# Patient Record
Sex: MALE | Race: OTHER | Employment: OTHER | URBAN - METROPOLITAN AREA
[De-identification: names, ages, dates, MRNs, and addresses within clinical notes are randomized per-mention and may not be internally consistent; named-entity substitution may affect disease eponyms.]

---

## 2017-03-20 ENCOUNTER — HOSPITAL ENCOUNTER (OUTPATIENT)
Dept: CT IMAGING | Facility: HOSPITAL | Age: 82
Discharge: HOME | End: 2017-03-20
Attending: INTERNAL MEDICINE
Payer: COMMERCIAL

## 2017-03-20 ENCOUNTER — TELEPHONE (OUTPATIENT)
Dept: INTERNAL MEDICINE CLINIC | Facility: CLINIC | Age: 82
End: 2017-03-20

## 2017-03-20 ENCOUNTER — OFFICE VISIT (OUTPATIENT)
Dept: INTERNAL MEDICINE CLINIC | Facility: CLINIC | Age: 82
End: 2017-03-20

## 2017-03-20 ENCOUNTER — HOSPITAL ENCOUNTER (OUTPATIENT)
Dept: CT IMAGING | Facility: HOSPITAL | Age: 82
Discharge: HOME | End: 2017-03-20
Attending: INTERNAL MEDICINE | Admitting: INTERNAL MEDICINE
Payer: COMMERCIAL

## 2017-03-20 ENCOUNTER — HOSPITAL ENCOUNTER (OUTPATIENT)
Dept: GENERAL RADIOLOGY | Facility: HOSPITAL | Age: 82
Discharge: HOME | End: 2017-03-20
Attending: INTERNAL MEDICINE
Payer: COMMERCIAL

## 2017-03-20 VITALS
RESPIRATION RATE: 18 BRPM | SYSTOLIC BLOOD PRESSURE: 160 MMHG | HEIGHT: 67 IN | HEART RATE: 66 BPM | BODY MASS INDEX: 19.17 KG/M2 | DIASTOLIC BLOOD PRESSURE: 86 MMHG | TEMPERATURE: 98 F | WEIGHT: 122.13 LBS

## 2017-03-20 DIAGNOSIS — S13.101A TRAUMATIC DISC HERNIATION OF CERVICAL SPINE: ICD-10-CM

## 2017-03-20 DIAGNOSIS — S09.90XA HEAD TRAUMA, INITIAL ENCOUNTER: Primary | ICD-10-CM

## 2017-03-20 DIAGNOSIS — S09.90XA HEAD TRAUMA, INITIAL ENCOUNTER: ICD-10-CM

## 2017-03-20 DIAGNOSIS — R05.9 COUGH: ICD-10-CM

## 2017-03-20 PROCEDURE — 71020 XR CHEST PA + LAT CHEST (CPT=71020): CPT

## 2017-03-20 PROCEDURE — G0463 HOSPITAL OUTPT CLINIC VISIT: HCPCS | Performed by: INTERNAL MEDICINE

## 2017-03-20 PROCEDURE — 72125 CT NECK SPINE W/O DYE: CPT

## 2017-03-20 PROCEDURE — 99214 OFFICE O/P EST MOD 30 MIN: CPT | Performed by: INTERNAL MEDICINE

## 2017-03-20 PROCEDURE — 70450 CT HEAD/BRAIN W/O DYE: CPT

## 2017-03-20 RX ORDER — ENALAPRIL MALEATE 20 MG/1
20 TABLET ORAL DAILY
Status: ON HOLD | COMMUNITY
End: 2017-04-29

## 2017-03-20 RX ORDER — NABUMETONE 750 MG/1
750 TABLET, FILM COATED ORAL 2 TIMES DAILY
COMMUNITY
End: 2017-03-23

## 2017-03-20 NOTE — PROGRESS NOTES
HPI:    Patient ID: Martín Shah is a 80year old male. As per patient's son, the patient has been falling several times at home in [facility]. In one occurrence, the neighbors found the patient on the floor due to fall.  The neighbors checked his blood OTHER SURGICAL HISTORY  2000    Comment blood clot removal from brain    HERNIA SURGERY        Family History   Problem Relation Age of Onset   • Heart Disorder Mother    • Hypertension Mother         Smoking Status: Former Smoker                   Packs/D Resp: 18   Height: 5' 7\" (1.702 m)   Weight: 122 lb 1.6 oz (55.384 kg)         Body mass index is 19.12 kg/(m^2). ASSESSMENT/PLAN:   (S09.90XA) Head trauma, initial encounter  (primary encounter diagnosis)  Patient c/o severe headaches.  HA sta

## 2017-03-20 NOTE — TELEPHONE ENCOUNTER
Pt was wheeled back by his son stating that the CT showed something and he wanted to know what it was. Pt son stated that they tried to reach Dr Smita Carnes twice but got no call back. Pt son would like to know what is wrong with his father.  Please advise

## 2017-03-20 NOTE — TELEPHONE ENCOUNTER
[place]año from [address] [place] [address] asking to speak with Dr. Jama Primrose to report STAT/ Hold CT results.

## 2017-03-21 NOTE — TELEPHONE ENCOUNTER
I was able to contact patient's son. I discussed the results of the CAT scans done and instructed him to make a follow-up appointment with Mr. Corey Simms in the next 2 days. The risks, benefits and side effects of treatment  plan  were dicussed with the patien

## 2017-03-21 NOTE — TELEPHONE ENCOUNTER
Please see other communication for this patient. I did talk to the patient's son and discussed the CAT scan results with him. He was instructed to make a follow-up appointment the next 2 days.

## 2017-03-21 NOTE — TELEPHONE ENCOUNTER
CONCLUSION:   1. There are small isodense extra-axial fluid collections at the left frontal subdural space, and at the posterior left parietal subdural space suggesting subacute subdural hematomas with minimal mass effect upon both sites.  No shift of midli

## 2017-03-21 NOTE — TELEPHONE ENCOUNTER
Elise Sushma at 3/20/2017  6:58 PM      Status: Signed : Elise Sushma     Expand All Collapse All    Pt was wheeled back by his son stating that the CT showed something and he wanted to know what it was.  Pt son stated that they trie

## 2017-03-21 NOTE — TELEPHONE ENCOUNTER
Please see communication from me I communicated with the patient's son but there is no answer and I left a message on his voicemail.

## 2017-03-23 ENCOUNTER — TELEPHONE (OUTPATIENT)
Dept: INTERNAL MEDICINE CLINIC | Facility: CLINIC | Age: 82
End: 2017-03-23

## 2017-03-23 RX ORDER — MELOXICAM 15 MG/1
15 TABLET ORAL DAILY
Qty: 30 TABLET | Refills: 0 | Status: SHIPPED | OUTPATIENT
Start: 2017-03-23 | End: 2017-04-18

## 2017-03-23 NOTE — TELEPHONE ENCOUNTER
Son calling states Dr asked him to call with pt update, pt still c/o HA.  Son requesting to speak to

## 2017-03-23 NOTE — TELEPHONE ENCOUNTER
See a medication for the patient to take once a day with breakfast.  He has to take it with food. Call the patient and relay the information.  Thanks

## 2017-03-23 NOTE — TELEPHONE ENCOUNTER
Son stts pt still has headaches and neck pain with little relief from Tylenol. Would like to know next step please.

## 2017-03-28 ENCOUNTER — TELEPHONE (OUTPATIENT)
Dept: INTERNAL MEDICINE CLINIC | Facility: CLINIC | Age: 82
End: 2017-03-28

## 2017-03-28 DIAGNOSIS — R26.81 UNSTABLE GAIT: Primary | ICD-10-CM

## 2017-03-28 DIAGNOSIS — M15.9 GENERALIZED OA: ICD-10-CM

## 2017-03-29 NOTE — TELEPHONE ENCOUNTER
Ordered was entered and signed for a light wheelchair. Agapito Jha Please let the patient know and send a copy to the patient. Thanks.

## 2017-04-18 RX ORDER — MELOXICAM 15 MG/1
TABLET ORAL
Qty: 30 TABLET | Refills: 0 | Status: ON HOLD | OUTPATIENT
Start: 2017-04-18 | End: 2017-05-05

## 2017-04-29 ENCOUNTER — HOSPITAL ENCOUNTER (INPATIENT)
Facility: HOSPITAL | Age: 82
LOS: 6 days | Discharge: NURSING, REHAB OR LONG-TERM CARE | DRG: 872 | End: 2017-05-05
Attending: EMERGENCY MEDICINE | Admitting: HOSPITALIST
Payer: COMMERCIAL

## 2017-04-29 ENCOUNTER — APPOINTMENT (OUTPATIENT)
Dept: CT IMAGING | Facility: HOSPITAL | Age: 82
DRG: 872 | End: 2017-04-29
Attending: EMERGENCY MEDICINE
Payer: COMMERCIAL

## 2017-04-29 ENCOUNTER — APPOINTMENT (OUTPATIENT)
Dept: GENERAL RADIOLOGY | Facility: HOSPITAL | Age: 82
DRG: 872 | End: 2017-04-29
Attending: EMERGENCY MEDICINE
Payer: COMMERCIAL

## 2017-04-29 DIAGNOSIS — K83.09 CHOLANGITIS: Primary | ICD-10-CM

## 2017-04-29 DIAGNOSIS — E11.65 DIABETES TYPE 2, UNCONTROLLED (HCC): ICD-10-CM

## 2017-04-29 PROBLEM — R73.9 HYPERGLYCEMIA: Status: ACTIVE | Noted: 2017-04-29

## 2017-04-29 PROBLEM — E87.20 METABOLIC ACIDOSIS: Status: ACTIVE | Noted: 2017-04-29

## 2017-04-29 PROBLEM — E87.2 METABOLIC ACIDOSIS: Status: ACTIVE | Noted: 2017-04-29

## 2017-04-29 PROCEDURE — 99223 1ST HOSP IP/OBS HIGH 75: CPT | Performed by: HOSPITALIST

## 2017-04-29 PROCEDURE — 70450 CT HEAD/BRAIN W/O DYE: CPT

## 2017-04-29 PROCEDURE — 71010 XR CHEST AP PORTABLE  (CPT=71010): CPT

## 2017-04-29 PROCEDURE — 74177 CT ABD & PELVIS W/CONTRAST: CPT

## 2017-04-29 PROCEDURE — 99223 1ST HOSP IP/OBS HIGH 75: CPT | Performed by: INTERNAL MEDICINE

## 2017-04-29 RX ORDER — SODIUM CHLORIDE 9 MG/ML
INJECTION, SOLUTION INTRAVENOUS
Status: DISPENSED
Start: 2017-04-29 | End: 2017-04-30

## 2017-04-29 RX ORDER — ONDANSETRON 2 MG/ML
4 INJECTION INTRAMUSCULAR; INTRAVENOUS EVERY 6 HOURS PRN
Status: DISCONTINUED | OUTPATIENT
Start: 2017-04-29 | End: 2017-05-05

## 2017-04-29 RX ORDER — ACETAMINOPHEN 325 MG/1
650 TABLET ORAL EVERY 6 HOURS PRN
Status: DISCONTINUED | OUTPATIENT
Start: 2017-04-29 | End: 2017-05-05

## 2017-04-29 RX ORDER — DEXTROSE MONOHYDRATE 25 G/50ML
50 INJECTION, SOLUTION INTRAVENOUS AS NEEDED
Status: DISCONTINUED | OUTPATIENT
Start: 2017-04-29 | End: 2017-05-05

## 2017-04-29 RX ORDER — SODIUM CHLORIDE 9 MG/ML
INJECTION, SOLUTION INTRAVENOUS
Status: COMPLETED
Start: 2017-04-29 | End: 2017-04-29

## 2017-04-29 RX ORDER — NABUMETONE 750 MG/1
750 TABLET, FILM COATED ORAL 2 TIMES DAILY
Status: ON HOLD | COMMUNITY
End: 2017-05-05

## 2017-04-29 RX ORDER — SODIUM CHLORIDE 9 MG/ML
INJECTION, SOLUTION INTRAVENOUS CONTINUOUS
Status: DISCONTINUED | OUTPATIENT
Start: 2017-04-29 | End: 2017-05-01

## 2017-04-29 NOTE — H&P
69 Dayanna Arango Patient Status:  Emergency    1932 MRN [id number]   Location [address] Attending Aditya Santillan MD   Hosp Day # 0 PCP Elijah Rios MD     Date:   Status: Never Used                        Alcohol Use: No              Allergies/Medications: Allergies: No Known Allergies    (Not in a hospital admission)    Review of Systems:   A comprehensive 12 point review of systems was completed.   Pertinent posi but slightly lower density than previously seen suggesting more chronic subdural hematomas without significant mass effect. Correlate clinically. No new subdural hemorrhage or parenchymal hemorrhage is seen.  There are moderate chronic appearing deep white at a later time.           Rafal Thomas MD      **Certification        PHYSICIAN Certification of Need for Inpatient Hospitalization - Initial Certification      Patient will require inpatient services that will reasonably be expected to span two midni

## 2017-04-29 NOTE — SEPSIS REASSESSMENT
[facility]    Sepsis Reassessment Note    /55 mmHg  Pulse 92  Temp(Src) 97.7 °F (36.5 °C) (Oral)  Resp 20  Ht 5' 6\" (1.676 m)  Wt 122 lb 3.2 oz (55.43 kg)  BMI 19.73 kg/m2  SpO2 99%     6:30 PM    Cardiac:  Regularity: Regular  Rat

## 2017-04-29 NOTE — PROGRESS NOTES
[facility] Dosing Service  Antibiotic Dosing    Kody Marques is a 80year old male for whom pharmacy is dosing Zosyn for treatment of  intra-abdominal infection . Allergies: has No Known Allergies.     Vitals: BP 99/53 mmHg  Pulse 99  Temp(Src) 99 °F

## 2017-04-29 NOTE — ED NOTES
U/A collected via straight cath. 16 fr straight cath inserted without difficulty with immediate return of dark yellow urine - specimen obtained and sent. Patient tolerated well.

## 2017-04-29 NOTE — ED INITIAL ASSESSMENT (HPI)
Pt came in via EMS for fatigue and decreased appetite since yesterday. Lives at home with family. 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 Walks independently at home per family however weak today, one assist. RR even and nonlabored.  Family denies co

## 2017-04-29 NOTE — ED PROVIDER NOTES
Patient Seen in: [facility] 3w/sw    History   Patient presents with:  Fatigue (constitutional, neurologic)    Stated Complaint: Cholangitis    HPI    80year old male who recently arrived to the [facility] 5 weeks ago from [facility] who has (PRINIVIL,ZESTRIL) 20 MG Oral Tab,  Take 20 mg by mouth daily. Glucose Blood ([employer] BLOOD GLUCOSE TEST) In Vitro Strip,  Test twice daily   glipiZIDE (GLUCOTROL) 10 MG Oral Tab,  Take 2 tablets by mouth 2 (two) times daily before meals.    Insulin NPH, light.   Neck: Normal range of motion. Neck supple. Cardiovascular: Normal rate, regular rhythm, normal heart sounds and intact distal pulses. No murmur heard. Pulmonary/Chest: Effort normal and breath sounds normal. No respiratory distress.  He has n components within normal limits   POCT GLUCOSE - Abnormal; Notable for the following:     POC Glucose  176 (*)     All other components within normal limits   POCT GLUCOSE - Abnormal; Notable for the following:     POC Glucose  182 (*)     All other compon left parietal subdural space about the same in overall amount from previous recent study of 3/20/17, but slightly lower density   than previously seen suggesting more chronic subdural hematomas without significant mass effect. Correlate clinically.  No new pref for further imaging and advises CT abd/pel with oral and iv contrast. Ok for pt to start oral contrast from ER, go to room, then have CT Abd from floor    Disposition and Plan     Clinical Impression:  Cholangitis  (primary encounter diagnosis)    Dis

## 2017-04-29 NOTE — CONSULTS
[facility]   Gastroenterology Consultation Note    Shexi Milian  Patient Status:    Emergency  Date of Admission:         4/29/2017, Hospital day #0  Attending:   Veronica Ornelas MD  PCP:     Ez Viveros MD    Reason for Consultation: alcohol or use illicit drugs. Allergies:  No Known Allergies    Medications:    Current facility-administered medications:   •  Piperacillin Sod-Tazobactam So (ZOSYN) 3.375 g in dextrose 5 % 100 mL IVPB, 3.375 g, Intravenous, Once  •  sodium chloride 0. 04/29/2017   TP 7.8 04/29/2017    04/29/2017    04/29/2017   LIP 14 04/29/2017       Imaging:  Ct Brain Or Head ([id number])    4/29/2017  CONCLUSION: Redemonstration of small extra-axial fluid collections at the left frontal subdural space, and a contrast  -IV abx with zosyn  -Trend LFTs, checking INR  -Will check viral hep panel (acute)  -Clears diet  -DVT ppx  -Continue outpatient ursodiol 300mg/day  -Will follow       Son balta @ [phone]    MD LETICIA Thompson AND ASHOK SRINIVASAN [facility]

## 2017-04-30 ENCOUNTER — APPOINTMENT (OUTPATIENT)
Dept: MRI IMAGING | Facility: HOSPITAL | Age: 82
DRG: 872 | End: 2017-04-30
Attending: INTERNAL MEDICINE
Payer: COMMERCIAL

## 2017-04-30 PROCEDURE — 99232 SBSQ HOSP IP/OBS MODERATE 35: CPT | Performed by: INTERNAL MEDICINE

## 2017-04-30 PROCEDURE — 76376 3D RENDER W/INTRP POSTPROCES: CPT

## 2017-04-30 PROCEDURE — 74183 MRI ABD W/O CNTR FLWD CNTR: CPT

## 2017-04-30 PROCEDURE — 99233 SBSQ HOSP IP/OBS HIGH 50: CPT | Performed by: HOSPITALIST

## 2017-04-30 RX ORDER — MAGNESIUM OXIDE 400 MG (241.3 MG MAGNESIUM) TABLET
800 TABLET ONCE
Status: COMPLETED | OUTPATIENT
Start: 2017-04-30 | End: 2017-04-30

## 2017-04-30 RX ORDER — SODIUM CHLORIDE 9 MG/ML
INJECTION, SOLUTION INTRAVENOUS
Status: DISPENSED
Start: 2017-04-30 | End: 2017-04-30

## 2017-04-30 NOTE — PROGRESS NOTES
[facility]     Gastroenterology Progress Note    Chetna Ambrocio Patient Status:  Inpatient    1932 MRN [id number]   Location [facility] 3W/SW Attending Diane Mckee MD   Hosp Day # 1 PCP Benja Frances MD       Sub Confusion slightly improved.      Recommend:  -IV abx to continue   -MRCP pending  -Trend LFTs  -Clears diet, NPO after MN for likely ERCP  -DVT ppx  -Continue outpatient ursodiol 300mg/day        Results:     Lab Results  Component Value Date   WBC 20.5* 0 Correlate with ERCP findings. There is significant intra-and extra hepatic biliary dilatation, and cholangitis cannot be excluded.  Marked pancreatic atrophy with a small amount of pancreatic tissue at the pancreatic head with a small focus of calcification

## 2017-04-30 NOTE — PROGRESS NOTES
[facility] Dosing Service  Antibiotic Dosing    Seb Sawyer is a 80year old male for whom pharmacy is dosing Merrem for treatment of  intra-abdominal infection . Allergies: has No Known Allergies.     Vitals: /55 mmHg  Pulse 92  Temp(Src) 97.7

## 2017-04-30 NOTE — PHYSICAL THERAPY NOTE
PHYSICAL THERAPY HIP EVALUATION - INPATIENT     Room Number: 329/329-A  Evaluation Date: 4/30/2017  Type of Evaluation: initial   Physician Order: PT Eval and Treat    Presenting Problem: admitted w/ AMS and progressive generalized weakness.   Reason for Th RESTRICTION  Weight Bearing Restriction: None                PAIN ASSESSMENT  Ratin          COGNITION  Oriented to person, place only w/ inconsistent command following requiring incr cues and redirection; communicated via pone  in [facility] [place] concerns addressed; Alarm set    ASSESSMENT     Patient is a 80year old male admitted 4/29/2017 for AM, progressive generalized weakness and RUQ pain. SDH redemonstrated in CT wo new hemorrhage, possible cholangitis and UTI.  Pt Surinamese speaking; communicat 4/30/2017

## 2017-04-30 NOTE — PROGRESS NOTES
[facility]    Progress Note    Espinoza Dennis Patient Status:  Inpatient    1932 MRN [id number]   Location [facility] 3W/SW Attending Uriel Jacob MD   Hosp Day # 1 PCP Gabriella Murillo MD       Subjective:   Espinoza Dennis is HCl, Pneumococcal Vac Polyvalent    Results:     Lab Results  Component Value Date   WBC 20.5* 04/30/2017   HGB 10.0* 04/30/2017   HCT 30.3* 04/30/2017   * 04/30/2017   CREATSERUM 1.19 04/30/2017   BUN 28* 04/30/2017    04/30/2017   K 4.1 04/3 Ct Abdomen+pelvis(contrast Only)(cpt=74177)    4/29/2017  CONCLUSION:  There is marked intra-and extrahepatic biliary dilatation with the common bile duct measuring up to 2 cm.  There is a stent in the mid to distal common bile duct extending into the d possible  -CLD  -appreciate GI and ID eval  -ruling out other potential sources with CXR and U/A, C+S    Elevated LFTs  -trending down  -hep panel ordered    Dementia  -start work up, TSH ok  -B12 borderline low, check MMA    DM II  -hold oral meds  -boby

## 2017-04-30 NOTE — CONSULTS
[facility]    Report of Consultation    Jewel Flowers Patient Status:  Inpatient    1932 MRN [id number]   Location [facility] 3W/SW Attending Essie Garcia MD   Hosp Day # 1 PCP Bard Mariama MD     Date of Admission:   Intravenous PRN   Glucose-Vitamin C (DEX-4) 4-0.006 g chewable tab 4 tablet 4 tablet Oral Q15 Min PRN   acetaminophen (TYLENOL) tab 650 mg 650 mg Oral Q6H PRN   ondansetron HCl (ZOFRAN) injection 4 mg 4 mg Intravenous Q6H PRN   0.9%  NaCl infusion  Intrave 113* 04/30/2017   ALT 89* 04/30/2017   INR 1.2 04/30/2017   PTP 14.6* 04/30/2017   TSH 1.57 04/29/2017   LIP 14* 04/29/2017   MG 1.5* 04/30/2017   B12 434 04/29/2017         Imaging:  Ct Brain Or Head ([id number])    4/29/2017  CONCLUSION: Redemonstration of sm feces in the rectosigmoid. Large posterior left lateral bladder wall diverticulum. Enlarged prostate.         Xr Chest Ap Portable  (cpt=71010)    4/29/2017  CONCLUSION: No acute pulmonary pathology on an AP portable view of the chest.              Davion

## 2017-05-01 ENCOUNTER — SURGERY (OUTPATIENT)
Age: 82
End: 2017-05-01

## 2017-05-01 ENCOUNTER — APPOINTMENT (OUTPATIENT)
Dept: GENERAL RADIOLOGY | Facility: HOSPITAL | Age: 82
DRG: 872 | End: 2017-05-01
Attending: INTERNAL MEDICINE
Payer: COMMERCIAL

## 2017-05-01 ENCOUNTER — ANESTHESIA (OUTPATIENT)
Dept: ENDOSCOPY | Facility: HOSPITAL | Age: 82
DRG: 872 | End: 2017-05-01
Payer: COMMERCIAL

## 2017-05-01 ENCOUNTER — TELEPHONE (OUTPATIENT)
Dept: GASTROENTEROLOGY | Facility: CLINIC | Age: 82
End: 2017-05-01

## 2017-05-01 ENCOUNTER — ANESTHESIA EVENT (OUTPATIENT)
Dept: ENDOSCOPY | Facility: HOSPITAL | Age: 82
DRG: 872 | End: 2017-05-01
Payer: COMMERCIAL

## 2017-05-01 PROCEDURE — 43274 ERCP DUCT STENT PLACEMENT: CPT | Performed by: INTERNAL MEDICINE

## 2017-05-01 PROCEDURE — 74328 X-RAY BILE DUCT ENDOSCOPY: CPT

## 2017-05-01 PROCEDURE — 0FC98ZZ EXTIRPATION OF MATTER FROM COMMON BILE DUCT, VIA NATURAL OR ARTIFICIAL OPENING ENDOSCOPIC: ICD-10-PCS | Performed by: INTERNAL MEDICINE

## 2017-05-01 PROCEDURE — 99497 ADVNCD CARE PLAN 30 MIN: CPT | Performed by: HOSPITALIST

## 2017-05-01 PROCEDURE — 43265 ERCP LITHOTRIPSY CALCULI: CPT | Performed by: INTERNAL MEDICINE

## 2017-05-01 PROCEDURE — BF101ZZ FLUOROSCOPY OF BILE DUCTS USING LOW OSMOLAR CONTRAST: ICD-10-PCS | Performed by: INTERNAL MEDICINE

## 2017-05-01 PROCEDURE — 99233 SBSQ HOSP IP/OBS HIGH 50: CPT | Performed by: HOSPITALIST

## 2017-05-01 PROCEDURE — 0FPB8DZ REMOVAL OF INTRALUMINAL DEVICE FROM HEPATOBILIARY DUCT, VIA NATURAL OR ARTIFICIAL OPENING ENDOSCOPIC: ICD-10-PCS | Performed by: INTERNAL MEDICINE

## 2017-05-01 DEVICE — BILIARY STENT WITH NAVIFLEXTM RX DELIVERY SYSTEM
Type: IMPLANTABLE DEVICE | Status: FUNCTIONAL
Brand: ADVANIX™ BILIARY

## 2017-05-01 RX ORDER — MORPHINE SULFATE 10 MG/ML
6 INJECTION, SOLUTION INTRAMUSCULAR; INTRAVENOUS EVERY 10 MIN PRN
Status: DISCONTINUED | OUTPATIENT
Start: 2017-05-01 | End: 2017-05-01 | Stop reason: HOSPADM

## 2017-05-01 RX ORDER — NALOXONE HYDROCHLORIDE 0.4 MG/ML
80 INJECTION, SOLUTION INTRAMUSCULAR; INTRAVENOUS; SUBCUTANEOUS AS NEEDED
Status: DISCONTINUED | OUTPATIENT
Start: 2017-05-01 | End: 2017-05-01 | Stop reason: HOSPADM

## 2017-05-01 RX ORDER — HYDROMORPHONE HYDROCHLORIDE 1 MG/ML
0.6 INJECTION, SOLUTION INTRAMUSCULAR; INTRAVENOUS; SUBCUTANEOUS EVERY 5 MIN PRN
Status: DISCONTINUED | OUTPATIENT
Start: 2017-05-01 | End: 2017-05-01 | Stop reason: HOSPADM

## 2017-05-01 RX ORDER — SUCCINYLCHOLINE CHLORIDE 20 MG/ML
INJECTION INTRAMUSCULAR; INTRAVENOUS AS NEEDED
Status: DISCONTINUED | OUTPATIENT
Start: 2017-05-01 | End: 2017-05-01 | Stop reason: SURG

## 2017-05-01 RX ORDER — HYDROMORPHONE HYDROCHLORIDE 1 MG/ML
0.4 INJECTION, SOLUTION INTRAMUSCULAR; INTRAVENOUS; SUBCUTANEOUS EVERY 5 MIN PRN
Status: DISCONTINUED | OUTPATIENT
Start: 2017-05-01 | End: 2017-05-01 | Stop reason: HOSPADM

## 2017-05-01 RX ORDER — MORPHINE SULFATE 2 MG/ML
2 INJECTION, SOLUTION INTRAMUSCULAR; INTRAVENOUS EVERY 10 MIN PRN
Status: DISCONTINUED | OUTPATIENT
Start: 2017-05-01 | End: 2017-05-01 | Stop reason: HOSPADM

## 2017-05-01 RX ORDER — SODIUM CHLORIDE, SODIUM LACTATE, POTASSIUM CHLORIDE, CALCIUM CHLORIDE 600; 310; 30; 20 MG/100ML; MG/100ML; MG/100ML; MG/100ML
INJECTION, SOLUTION INTRAVENOUS CONTINUOUS
Status: DISCONTINUED | OUTPATIENT
Start: 2017-05-01 | End: 2017-05-02

## 2017-05-01 RX ORDER — HYDROMORPHONE HYDROCHLORIDE 1 MG/ML
0.2 INJECTION, SOLUTION INTRAMUSCULAR; INTRAVENOUS; SUBCUTANEOUS EVERY 5 MIN PRN
Status: DISCONTINUED | OUTPATIENT
Start: 2017-05-01 | End: 2017-05-01 | Stop reason: HOSPADM

## 2017-05-01 RX ORDER — HYDROCODONE BITARTRATE AND ACETAMINOPHEN 5; 325 MG/1; MG/1
2 TABLET ORAL AS NEEDED
Status: DISCONTINUED | OUTPATIENT
Start: 2017-05-01 | End: 2017-05-01 | Stop reason: HOSPADM

## 2017-05-01 RX ORDER — SODIUM CHLORIDE, SODIUM LACTATE, POTASSIUM CHLORIDE, CALCIUM CHLORIDE 600; 310; 30; 20 MG/100ML; MG/100ML; MG/100ML; MG/100ML
INJECTION, SOLUTION INTRAVENOUS CONTINUOUS
Status: DISCONTINUED | OUTPATIENT
Start: 2017-05-01 | End: 2017-05-01

## 2017-05-01 RX ORDER — POTASSIUM CHLORIDE 14.9 MG/ML
20 INJECTION INTRAVENOUS ONCE
Status: DISCONTINUED | OUTPATIENT
Start: 2017-05-01 | End: 2017-05-05

## 2017-05-01 RX ORDER — ONDANSETRON 2 MG/ML
4 INJECTION INTRAMUSCULAR; INTRAVENOUS ONCE AS NEEDED
Status: DISCONTINUED | OUTPATIENT
Start: 2017-05-01 | End: 2017-05-01 | Stop reason: HOSPADM

## 2017-05-01 RX ORDER — GLYCOPYRROLATE 0.2 MG/ML
INJECTION INTRAMUSCULAR; INTRAVENOUS AS NEEDED
Status: DISCONTINUED | OUTPATIENT
Start: 2017-05-01 | End: 2017-05-01 | Stop reason: SURG

## 2017-05-01 RX ORDER — HALOPERIDOL 5 MG/ML
0.25 INJECTION INTRAMUSCULAR ONCE AS NEEDED
Status: DISCONTINUED | OUTPATIENT
Start: 2017-05-01 | End: 2017-05-01 | Stop reason: HOSPADM

## 2017-05-01 RX ORDER — HYDROCODONE BITARTRATE AND ACETAMINOPHEN 5; 325 MG/1; MG/1
1 TABLET ORAL AS NEEDED
Status: DISCONTINUED | OUTPATIENT
Start: 2017-05-01 | End: 2017-05-01 | Stop reason: HOSPADM

## 2017-05-01 RX ORDER — 0.9 % SODIUM CHLORIDE 0.9 %
VIAL (ML) INJECTION
Status: COMPLETED
Start: 2017-05-01 | End: 2017-05-01

## 2017-05-01 RX ORDER — MORPHINE SULFATE 4 MG/ML
4 INJECTION, SOLUTION INTRAMUSCULAR; INTRAVENOUS EVERY 10 MIN PRN
Status: DISCONTINUED | OUTPATIENT
Start: 2017-05-01 | End: 2017-05-01 | Stop reason: HOSPADM

## 2017-05-01 RX ORDER — SODIUM CHLORIDE, SODIUM LACTATE, POTASSIUM CHLORIDE, CALCIUM CHLORIDE 600; 310; 30; 20 MG/100ML; MG/100ML; MG/100ML; MG/100ML
INJECTION, SOLUTION INTRAVENOUS CONTINUOUS
Status: DISCONTINUED | OUTPATIENT
Start: 2017-05-02 | End: 2017-05-02

## 2017-05-01 RX ADMIN — GLYCOPYRROLATE 0.2 MG: 0.2 INJECTION INTRAMUSCULAR; INTRAVENOUS at 17:45:00

## 2017-05-01 RX ADMIN — SUCCINYLCHOLINE CHLORIDE 60 MG: 20 INJECTION INTRAMUSCULAR; INTRAVENOUS at 17:45:00

## 2017-05-01 NOTE — PROGRESS NOTES
[facility]    Progress Note    Meet Roman Patient Status:  Inpatient    1932 MRN [id number]   Location [facility] 3W/SW Attending Camryn Doshi MD   Hosp Day # 2 PCP Marlee Velasquez MD       Subjective:   Meet Roman is 0.9%  1,000 mL Intravenous Once   • meropenem  500 mg Intravenous Q12H   • insulin aspart  1-5 Units Subcutaneous TID CC and HS       Current PRN Inpatient Meds:      dextrose, Glucose-Vitamin C, acetaminophen, ondansetron HCl, Pneumococcal Vac Polyvalent distal common hepatic duct adjacent to the stent causing significant obstruction of the common bile duct and may suggest either a very large gallstone or neoplasm. Correlate with ERCP findings.  There is significant intra-and extra hepatic biliary dilatatio para-aortic retroperitoneal lymphadenopathy. Largest retrocrural lymph node at the GE junction measures 2.4 x 1.5 cm.              Assessment and Plan:   E. Coli sepsis 2/2 suspected biliary source  Likely cholangitis    Bacteremia with GNR  -will follow se

## 2017-05-01 NOTE — TELEPHONE ENCOUNTER
Alvin Jaramillo from Radiology, states she just faxed report of MRI abdomen, requesting if a call back to advise if did not receive it.

## 2017-05-01 NOTE — ANESTHESIA PREPROCEDURE EVALUATION
Anesthesia PreOp Note    HPI:     Steven Varela is a 80year old male who presents for preoperative consultation requested by: Ana Todd MD    Date of Surgery: 4/29/2017 - 5/1/2017    Procedure(s):  ENDOSCOPIC RETROGRADE Dax Bilis Ordered in Epic:  potassium chloride IVPB premix 20 mEq 20 mEq Intravenous Once Kvng Garcia MD     Insulin Regular Human (NOVOLIN R) 100 UNIT/ML injection 1-5 Units 1-5 Units Subcutaneous Q6H Kvng Garcia MD 1 Units at 05/01/17 Siddharth hameed Results  Component Value Date   WBC 12.1* 05/01/2017   RBC 2.80* 05/01/2017   HGB 8.6* 05/01/2017   HCT 25.8* 05/01/2017   MCV 92.3 05/01/2017   MCH 30.6 05/01/2017   MCHC 33.2 05/01/2017   RDW 15.4* 05/01/2017   * 05/01/2017   MPV 11.4* 05/01/2017 Discussed With:  Patient  Discussed plan with:  Surgeon  Provider Attestation (if preop done by other):  Pt seen, chart reviewed,  Agree with plan, VAUGHN      I have informed Gissel Dumont  of the nature of the anesthetic plan, benefits, risks, major complic

## 2017-05-01 NOTE — PHYSICAL THERAPY NOTE
Chart reviewed   Pt did see OT this AM for eval     PT eval completed 4/30/17 see chart     When PT attempted follow up treatment session noted pt not in room at endoscopy      Pt not available for session   PT will follow and reattempt as schedule allows

## 2017-05-01 NOTE — BRIEF OP NOTE
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

## 2017-05-01 NOTE — PLAN OF CARE
Patient/Family Goals    • Patient/Family Long Term Goal Progressing    • Patient/Family Short Term Goal Progressing        Patient will go to rehab after hospitalization.      GENITOURINARY - ADULT    • Absence of urinary retention Progressing        Good o

## 2017-05-01 NOTE — PROGRESS NOTES
INFECTIOUS DISEASE PROGRESS NOTE    Nelda Gay Patient Status:  Inpatient    1932 MRN [id number]   Location [facility] 3W/SW Attending Srinivas Rubio MD   Hosp Day # 2 PCP Dania Sutherland MD     Subjective:  Patient seen and examined, no holding their breath for MR imaging m                  Magnetic resonance cholangiopancreatography was also performed.                    MRCP FINDINGS:     GALLBLADDER:           Normal.  No gallstones.     BILE DUCTS:    Marked intra-and extrahepatic esther retroperitoneal lymphadenopathy in the left periaortic region measuring 12 mm shortest diameter.  Subphrenic lymph node at the GE junction measures 2.4 x 1.6 cm in the diaphragmatic hiatus    ASCITES:         None.     BONES:           Mild dextroscoliosis with shaking chills, sepsis, leukocytosis, elevated LFTs.  CT shows CBD stent with mass-like area obstructing CBD.  UA with mild pyuria, ucx with >100,000 GNRs.  BC + GNRs.  Started on meropenem.  ID consulted.      # Ecoli sepsis, bacteremia--> likely due

## 2017-05-01 NOTE — DISCHARGE PLANNING
5/1CM-MD orders received in regards to discharge planning. The Patient and his son Anthony Sifuentes ([phone]) were  seen at bedside. The Patient resides with his son and daughter in law in [place] in a apartment building with 13 stairs to his apartment.   Pr

## 2017-05-02 PROCEDURE — 99233 SBSQ HOSP IP/OBS HIGH 50: CPT | Performed by: HOSPITALIST

## 2017-05-02 PROCEDURE — 99232 SBSQ HOSP IP/OBS MODERATE 35: CPT | Performed by: INTERNAL MEDICINE

## 2017-05-02 RX ORDER — MAGNESIUM OXIDE 400 MG (241.3 MG MAGNESIUM) TABLET
400 TABLET ONCE
Status: COMPLETED | OUTPATIENT
Start: 2017-05-02 | End: 2017-05-02

## 2017-05-02 NOTE — PROGRESS NOTES
INFECTIOUS DISEASE PROGRESS NOTE    Seb Sawyer Patient Status:  Inpatient    1932 MRN [id number]   Location [facility] 3W/SW Attending Skeeter Jeans, MD   Hosp Day # 3 PCP Lai Cotton MD     Subjective:  Patient seen and examined, no Multiple repeat    attempts. Patient had difficulty holding their breath for MR imaging m                  Magnetic resonance cholangiopancreatography was also performed.                    MRCP FINDINGS:     GALLBLADDER:           Normal.  No gallstones.   patent.     LYMPHADENOPATHY: Abnormal retroperitoneal lymphadenopathy in the left periaortic region measuring 12 mm shortest diameter.  Subphrenic lymph node at the GE junction measures 2.4 x 1.6 cm in the diaphragmatic hiatus    ASCITES:         None.    placement in [facility].  Now presents here with shaking chills, sepsis, leukocytosis, elevated LFTs.  CT shows CBD stent with mass-like area obstructing CBD.  UA with mild pyuria, ucx with >100,000 GNRs.  BC + GNRs.  Started on meropenem.  ID consulted.

## 2017-05-02 NOTE — OPERATIVE REPORT
[facility]    PATIENT'S NAME: Sung Mealing   ATTENDING PHYSICIAN: Alexis Tarango MD   OPERATING PHYSICIAN: Melyssa Fonseca.  Hernando Butcher MD   PATIENT ACCOUNT#:   [id number]    LOCATION:  [address] [place] [address] #:   [id number]       DATE OF BIR and pulled up out through the gastroscope and discarded it. The side viewer duodenoscope was then advanced back down to the duodenum.   Common bile duct was cannulated using a [employer] Hydratome sphincterotomy catheter and 8.997 inch hydrophilic g immobilized. I attempted to catch this with 2 different sized trapezoid basket which only slid over it. I did inflate a 15 mm balloon above it and pulled down and the balloon lodged against what appeared to be an unmoving stone or stricture or both.   Hector Bear

## 2017-05-02 NOTE — PROGRESS NOTES
[facility]     Gastroenterology Progress Note    Uzma Murdock Patient Status:  Inpatient    1932 MRN [id number]   Location [facility] 3W/SW Attending Padma Peralta MD   Hosp Day # 3 PCP Chanda Peck MD       Sub mid CBD (stone vs stricture vs neoplasm), unable to clear this. New plastic stent placed. #Biliary  Obstruction - resolved  #Leukocytosis - improved  #AMS -improved, suspect he has underlying dementia  #E. coli Bacteremia - cholangitis  #Choledocholiathi

## 2017-05-02 NOTE — PLAN OF CARE
GASTROINTESTINAL - ADULT    • Minimal or absence of nausea and vomiting Progressing    • Maintains or returns to baseline bowel function Progressing    • Maintains adequate nutritional intake (undernourished) Progressing        Diet moved up to soft, low f

## 2017-05-02 NOTE — PHYSICAL THERAPY NOTE
PHYSICAL THERAPY TREATMENT NOTE - INPATIENT    Room Number: 329/329-A       Presenting Problem: admitted w/ AMS and progressive generalized weakness.     Problem List  Principal Problem:    Cholangitis  Active Problems:    Hyperglycemia    Metabolic acidos Provided a depend  Pt did not want it on just to have it   \"I paid for it \"    Denies pain      Wants to call a friend      OBJECTIVE  Precautions: Bed/chair alarm    WEIGHT BEARING RESTRICTION  Weight Bearing Restriction: None                PAIN ASSESS session/findings; All patient questions and concerns addressed; Alarm set    CURRENT GOALS       CURRENT GOALS  To be met 5/5/17  Goal #1   Patient is able to demonstrate supine<-> sit EOB ind.    Current min assist supine to sit and scoot to EOB      Goal #

## 2017-05-02 NOTE — ANESTHESIA POSTPROCEDURE EVALUATION
Patient: Last Gray    Procedure Summary     Date Anesthesia Start Anesthesia Stop Room / Location    05/01/17 [id number] [facility] ENDOSCOPY 01 / [facility] ENDOSCOPY       Procedure Diagnosis Surgeon Responsible Provider    ENDOSCOPIC RETROGRADE CHOLANGIOPANCREATOGR

## 2017-05-02 NOTE — PROGRESS NOTES
[facility]    Progress Note    Loretta Denson Patient Status:  Inpatient    1932 MRN [id number]   Location [facility] 3W/SW Attending Vishnu Banerjee MD   Hosp Day # 3 PCP Gunnar Mathew MD       Subjective:   Loretta Denson is Pneumococcal Vac Polyvalent    Results:       Lab Results  Component Value Date   WBC 10.5 05/02/2017   HGB 10.6* 05/02/2017   HCT 30.7* 05/02/2017   * 05/02/2017   CREATSERUM 0.96 05/02/2017   BUN 16 05/02/2017   * 05/02/2017   K 3.9 05/02/20 mass vs stone in the CHD with stent in the CBD not known to family - will need further records if possible  -s/p ERCP with stent and stone removal which showed Cholangitis, choledocholithiasis, impacted stone, and 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

## 2017-05-03 PROCEDURE — 99223 1ST HOSP IP/OBS HIGH 75: CPT | Performed by: OTHER

## 2017-05-03 PROCEDURE — 99233 SBSQ HOSP IP/OBS HIGH 50: CPT | Performed by: HOSPITALIST

## 2017-05-03 PROCEDURE — 99232 SBSQ HOSP IP/OBS MODERATE 35: CPT | Performed by: INTERNAL MEDICINE

## 2017-05-03 RX ORDER — MAGNESIUM OXIDE 400 MG (241.3 MG MAGNESIUM) TABLET
400 TABLET ONCE
Status: COMPLETED | OUTPATIENT
Start: 2017-05-03 | End: 2017-05-03

## 2017-05-03 RX ORDER — 0.9 % SODIUM CHLORIDE 0.9 %
VIAL (ML) INJECTION
Status: COMPLETED
Start: 2017-05-03 | End: 2017-05-03

## 2017-05-03 RX ORDER — LISINOPRIL 10 MG/1
10 TABLET ORAL DAILY
Status: DISCONTINUED | OUTPATIENT
Start: 2017-05-03 | End: 2017-05-05

## 2017-05-03 RX ORDER — DONEPEZIL HYDROCHLORIDE 5 MG/1
5 TABLET, FILM COATED ORAL NIGHTLY
Status: DISCONTINUED | OUTPATIENT
Start: 2017-05-03 | End: 2017-05-05

## 2017-05-03 NOTE — PROGRESS NOTES
INFECTIOUS DISEASE PROGRESS NOTE    Chetna Ambrocio Patient Status:  Inpatient    1932 MRN [id number]   Location [facility] 3W/SW Attending Diane Mckee MD   Hosp Day # 4 PCP Benja Frances MD     Subjective:  Patient seen and examined, no gadolinium infusion. Multiple repeat    attempts.  Patient had difficulty holding their breath for MR imaging m                  Magnetic resonance cholangiopancreatography was also performed.                    MRCP FINDINGS:     GALLBLADDER:           Nor mesenteric veins grossly patent.     LYMPHADENOPATHY: Abnormal retroperitoneal lymphadenopathy in the left periaortic region measuring 12 mm shortest diameter.  Subphrenic lymph node at the GE junction measures 2.4 x 1.6 cm in the diaphragmatic hiatus    AS In 2015 had cholecystectomy and CBD stent placement in [facility].  Now presents here with shaking chills, sepsis, leukocytosis, elevated LFTs.  CT shows CBD stent with mass-like area obstructing CBD.  UA with mild pyuria, ucx with >100,000 GNRs.  BC + GN

## 2017-05-03 NOTE — CONSULTS
[facility]    PATIENT'S NAME: Oleg Carter PHYSICIAN: Mary Khan MD   CONSULTING PHYSICIAN: Alejandrina Steinberg MD   PATIENT ACCOUNT#:   [id number]    LOCATION:  [address] [place] [address] #:   [id number]       DATE OF BIRTH: common bile duct. The patient eventually underwent an ERCP where some of the stones were removed. Stents were placed, and apparently the patient has had a return to the GI lab where he underwent a repeat ERCP under general anesthesia.   The ERCP showed th artifacts in the right and middle portal veins, an hepatic Doppler ultrasound has been recommended. Abnormal left periaortic retroperitoneal lymphadenopathy. The patient continued to be treated medical-wise, and I was asked for an opinion.     PAST MEDICA no tenderness. Bowel sounds are hypoactive but present. EXTREMITIES:  Within normal limits. NEUROLOGIC:  Other than the altered mental status, seems to be normal in the sense that motor and sensory functions are preserved throughout.   The patient does a

## 2017-05-03 NOTE — PROGRESS NOTES
[facility] - [facility]    Progress Note    Carmen Joyce Patient Status:  Inpatient    1932 MRN [id number]   Location [facility] 3W/SW Attending Kiara Flores MD   [facility] Day # 4 PCP Estefani Elizondo MD       Subjective:   Carmen Joyce is f Glucose-Vitamin C, acetaminophen, ondansetron HCl, Pneumococcal Vac Polyvalent    Results:       Lab Results  Component Value Date   WBC 10.8 05/03/2017   HGB 11.1* 05/03/2017   HCT 33.9* 05/03/2017    05/03/2017   CREATSERUM 0.93 05/03/2017   BUN 1 with stent in the CBD not known to family - will need further records if possible  s/p ERCP with stent and stone removal which showed Cholangitis, choledocholithiasis, impacted stone, and 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   Consulted surg

## 2017-05-03 NOTE — OCCUPATIONAL THERAPY NOTE
OCCUPATIONAL THERAPY TREATMENT NOTE - INPATIENT     Room Number: 329/329-A          Presenting Problem:  (Fatigue, sepsis)    Problem List  Principal Problem:    Cholangitis  Active Problems:    Hyperglycemia    Metabolic acidosis    TIA (transient ischemi drying)?: A Little  -   Toileting, which includes using toilet, bedpan or urinal? : None  -   Putting on and taking off regular upper body clothing?: None  -   Taking care of personal grooming such as brushing teeth?: None  -   Eating meals?: None    AM-PA

## 2017-05-03 NOTE — PROGRESS NOTES
[facility]     Gastroenterology Progress Note    Martín Shah Patient Status:  Inpatient    1932 MRN [id number]   Location [facility] 3W/SW Attending Annette Belcher MD   Hosp Day # 4 PCP Ally Chavis MD       Sub neoplasm), unable to clear this. New plastic stent placed. #Biliary  Obstruction - resolved  #Leukocytosis - improved  #AMS -improved, suspect he has underlying dementia  #E. coli Bacteremia - cholangitis  #Choledocholiathisis  #Perisstent CBD filling de

## 2017-05-03 NOTE — CONSULTS
[facility]    PATIENT'S NAME: Earnest Ou PHYSICIAN: Leigh Pollock MD   CONSULTING PHYSICIAN: Conchis Rico MD   PATIENT ACCOUNT#:   [id number]    LOCATION:  [address] [place] [address] Avenue #:   [id number]       DATE OF BIRTH:  [birth year] pressure 155/84, pulse oximetry 97%. HEENT:  Pupils reactive to light. Optic discs are flat. Visual fields are full.   NEUROLOGIC:  Cranial nerves III through VII and IX through XII are normal.  Strength in the arms and legs is normal.  Deep tendon refle

## 2017-05-03 NOTE — CONSULTS
Consult dictated– I believe this represents dementia. There have been falls. CT scan of the head, B12, TSH levels noted. Ammonia level is normal.  I do not find any clinical evidence for any acute neurological process. Physical therapy notes reviewed.

## 2017-05-04 PROCEDURE — 99232 SBSQ HOSP IP/OBS MODERATE 35: CPT | Performed by: INTERNAL MEDICINE

## 2017-05-04 PROCEDURE — 99232 SBSQ HOSP IP/OBS MODERATE 35: CPT | Performed by: HOSPITALIST

## 2017-05-04 PROCEDURE — 99233 SBSQ HOSP IP/OBS HIGH 50: CPT | Performed by: OTHER

## 2017-05-04 RX ORDER — MAGNESIUM OXIDE 400 MG (241.3 MG MAGNESIUM) TABLET
400 TABLET ONCE
Status: COMPLETED | OUTPATIENT
Start: 2017-05-04 | End: 2017-05-04

## 2017-05-04 RX ORDER — 0.9 % SODIUM CHLORIDE 0.9 %
VIAL (ML) INJECTION
Status: COMPLETED
Start: 2017-05-04 | End: 2017-05-04

## 2017-05-04 RX ORDER — SODIUM CHLORIDE 9 MG/ML
INJECTION, SOLUTION INTRAVENOUS
Status: COMPLETED
Start: 2017-05-04 | End: 2017-05-04

## 2017-05-04 NOTE — PROGRESS NOTES
[facility]     Gastroenterology Progress Note    Carmen Cook Patient Status:  Inpatient    1932 MRN [id number]   Location [facility] 3W/SW Attending Thomas Teague MD   Hosp Day # 5 PCP Estefani Elizondo MD       Sub unable to clear this. New plastic stent placed. #Biliary  Obstruction - resolved  #Leukocytosis - improved  #AMS -improved, suspect he has underlying dementia  #E. coli Bacteremia - cholangitis  #Choledocholiathisis  #Perisstent CBD filling defect  #Panc

## 2017-05-04 NOTE — PROGRESS NOTES
[facility]    Progress Note    Johnathon Johnson Patient Status:  Inpatient    1932 MRN [id number]   Location [facility] 3W/SW Attending Marek Randall MD   [facility] Day # 5 PCP Miriam George MD       Subjective:   Johnathon Pavantri is i Intravenous Once   • insulin aspart  1-5 Units Subcutaneous TID CC and HS       Current PRN Inpatient Meds:      dextrose, Glucose-Vitamin C, acetaminophen, ondansetron HCl, Pneumococcal Vac Polyvalent    Results:       Lab Results  Component Value Date stone removal which showed Cholangitis, choledocholithiasis, impacted stone, and 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   Appreciate surgery eval  Likely will need f/u ERCP in 3-4 weeks     [place] UTI  Cont abx    Elevated LFTs  trending d

## 2017-05-04 NOTE — DISCHARGE PLANNING
5/4CM-MD orders received in regards to discharge planning-DISCHARGE [facility]. The Patient's RN informed this Writer that the Patient will be medically stable for discharge tomorrow (5/4).  This Writer has informed [facility] [employer] o

## 2017-05-05 ENCOUNTER — TELEPHONE (OUTPATIENT)
Dept: CARDIOLOGY UNIT | Facility: HOSPITAL | Age: 82
End: 2017-05-05

## 2017-05-05 ENCOUNTER — TELEPHONE (OUTPATIENT)
Dept: GASTROENTEROLOGY | Facility: CLINIC | Age: 82
End: 2017-05-05

## 2017-05-05 VITALS
HEART RATE: 95 BPM | BODY MASS INDEX: 19.75 KG/M2 | DIASTOLIC BLOOD PRESSURE: 56 MMHG | TEMPERATURE: 98 F | RESPIRATION RATE: 20 BRPM | OXYGEN SATURATION: 96 % | WEIGHT: 122.88 LBS | SYSTOLIC BLOOD PRESSURE: 138 MMHG | HEIGHT: 66 IN

## 2017-05-05 PROCEDURE — 99239 HOSP IP/OBS DSCHRG MGMT >30: CPT | Performed by: HOSPITALIST

## 2017-05-05 PROCEDURE — 99232 SBSQ HOSP IP/OBS MODERATE 35: CPT | Performed by: INTERNAL MEDICINE

## 2017-05-05 RX ORDER — GLIPIZIDE 10 MG/1
5 TABLET ORAL
Qty: 120 TABLET | Refills: 0 | Status: SHIPPED | OUTPATIENT
Start: 2017-05-05 | End: 2017-05-23 | Stop reason: ALTCHOICE

## 2017-05-05 RX ORDER — CIPROFLOXACIN 500 MG/1
500 TABLET, FILM COATED ORAL EVERY 12 HOURS SCHEDULED
Status: DISCONTINUED | OUTPATIENT
Start: 2017-05-05 | End: 2017-05-05

## 2017-05-05 RX ORDER — CIPROFLOXACIN 500 MG/1
500 TABLET, FILM COATED ORAL EVERY 12 HOURS SCHEDULED
Qty: 20 TABLET | Refills: 0 | Status: SHIPPED | OUTPATIENT
Start: 2017-05-05 | End: 2017-05-15

## 2017-05-05 RX ORDER — DONEPEZIL HYDROCHLORIDE 5 MG/1
5 TABLET, FILM COATED ORAL NIGHTLY
Qty: 30 TABLET | Refills: 0 | Status: SHIPPED | OUTPATIENT
Start: 2017-05-05 | End: 2017-05-23

## 2017-05-05 RX ORDER — LISINOPRIL 20 MG/1
20 TABLET ORAL DAILY
Qty: 30 TABLET | Refills: 0 | Status: SHIPPED | OUTPATIENT
Start: 2017-05-05 | End: 2017-05-23

## 2017-05-05 RX ORDER — MAGNESIUM OXIDE 400 MG (241.3 MG MAGNESIUM) TABLET
400 TABLET ONCE
Status: COMPLETED | OUTPATIENT
Start: 2017-05-05 | End: 2017-05-05

## 2017-05-05 RX ORDER — POTASSIUM CHLORIDE 20 MEQ/1
40 TABLET, EXTENDED RELEASE ORAL ONCE
Status: COMPLETED | OUTPATIENT
Start: 2017-05-05 | End: 2017-05-05

## 2017-05-05 NOTE — PROGRESS NOTES
INFECTIOUS DISEASE PROGRESS NOTE    Lesa Christie Patient Status:  Inpatient    1932 MRN [id number]   Location [facility] 3W/SW Attending Jenny Singh MD   Hosp Day # 6 PCP Kika Yee MD     Subjective:  Patient seen and examined, no breath for MR imaging m                  Magnetic resonance cholangiopancreatography was also performed.                    MRCP FINDINGS:     GALLBLADDER:           Normal.  No gallstones.     BILE DUCTS:    Marked intra-and extrahepatic biliary dilatatio lymphadenopathy in the left periaortic region measuring 12 mm shortest diameter. Subphrenic lymph node at the GE junction measures 2.4 x 1.6 cm in the diaphragmatic hiatus    ASCITES:         None.     BONES:           Mild dextroscoliosis midlumbar spine. presents here with shaking chills, sepsis, leukocytosis, elevated LFTs.  CT shows CBD stent with mass-like area obstructing CBD.  UA with mild pyuria, ucx with >100,000 GNRs.  BC + GNRs.  Started on meropenem.  ID consulted.      # Ecoli sepsis, bacteremia-

## 2017-05-05 NOTE — DISCHARGE PLANNING
5/5CM-The Patient's RN informed this Writer that the Patient is medically stable for discharge today (5/5).  This Writer informed [address] [place] [address] of the above, they have insurance approval and are  accepted the Patient  today (5/5) for transfer at 3:0

## 2017-05-05 NOTE — TELEPHONE ENCOUNTER
Adi King MD  P Em Gi Clinical Staff Cc: Jennifer Carter MD                   GI Clinical Staff:   -patient needs outpatient ERCP in 4-6 weeks w/Dr. Winnie Tran. Can you please contact Darrell gifford @ [phone] to help schedule.  Patient in [place] [address]

## 2017-05-05 NOTE — DISCHARGE SUMMARY
[facility] - [facility]    Discharge Summary    Brigitte Montgomery Patient Status:  Inpatient    1932 MRN [id number]   Location [facility] 3W/SW Attending Leo Gomez MD   Hosp Day # 6 PCP Tavon Montes MD     Date of Admission: / HTN, falls with prior subdural hematomas and s/p jil hole decompression, TIAs.   He has been living in [facility]. He was not doing well there, falling and suffering from failure to thrive.  His son went to see him and brought him here to [facility] about 5 Ciprofloxacin HCl 500 MG Tabs   Last time this was given:  500 mg on 5/5/2017 12:29 PM   Commonly known as:  CIPRO        Take 1 tablet (500 mg total) by mouth every 12 (twelve) hours.     Stop taking on:  5/15/2017   Quantity:  20 tablet   Refills:  0 Follow-up With Details Why Contact Info   Se Mahmood Schedule an appointment as soon as possible for a visit Diabetes discharge follow up appointment.  [facility] month  133

## 2017-05-05 NOTE — PROGRESS NOTES
[facility]     Gastroenterology Progress Note    Loretta Denson Patient Status:  Inpatient    1932 MRN [id number]   Location [facility] 3W/SW Attending Vishnu Banerjee MD   Hosp Day # 6 PCP Gunnar Mathew MD       Sub stricture vs neoplasm), unable to clear this. New plastic stent placed. #Biliary  Obstruction - resolved  #Leukocytosis - improved  #AMS -improved, suspect he has underlying dementia  #E. coli Bacteremia - cholangitis  #Choledocholiathisis  #Perisstent C

## 2017-05-08 ENCOUNTER — TELEPHONE (OUTPATIENT)
Dept: GASTROENTEROLOGY | Facility: CLINIC | Age: 82
End: 2017-05-08

## 2017-05-08 NOTE — TELEPHONE ENCOUNTER
Routed to [facility] to schedule, thank you. Please see TE 5/5/17 for orders, thank you.

## 2017-05-10 NOTE — TELEPHONE ENCOUNTER
GI RNs–  Please schedule Mr. Prosper Alva for outpatient ERCP at [address] [place] [address], with Anesthesia as with all ERCPs  DX = choledocholithiasis, indwelling bile duct stent    Please schedule with the \"Spyglass\" system and the stone lithotriptor Spyglass equipment.   This nee

## 2017-05-11 NOTE — TELEPHONE ENCOUNTER
Contacted [facility] with Jason Davies for [facility] to schedule a 1 month post op f/u appt with Dr. William Felix on 6/7/17 @12:30pm.     Aware they need to call to confirm the appt in order to add them to the MD schedule.      CSS/NICA- Please confirm pt appt and

## 2017-05-17 NOTE — TELEPHONE ENCOUNTER
Dr. Jacinta Bal are booked out until mid July and since we need 90 min this will be a challenge to schedule earlier. I am thinking that if your available to schedule on a Wednesday morning we can schedule earlier.  Please advise on a good date for you since

## 2017-05-18 NOTE — TELEPHONE ENCOUNTER
Per pharmacy on file needs Free Style light Meter , Lancets and test strips, with ICD 10 Dx code Pls send to pharmacy on file.

## 2017-05-18 NOTE — TELEPHONE ENCOUNTER
Dr. Hernando Butcher-- spoke with Megha Palacios in Endo to check on availability for Wed. 5/24/17 which at this time there is not available for this date but once the block drops on Monday 5/22/17 there is a good possibility that we can schedule 2 hours for this ERCP w

## 2017-05-19 ENCOUNTER — TELEPHONE (OUTPATIENT)
Dept: INTERNAL MEDICINE CLINIC | Facility: CLINIC | Age: 82
End: 2017-05-19

## 2017-05-19 NOTE — TELEPHONE ENCOUNTER
Jethro Lawton from [facility] calling [facility] that pt was discharged 05/17  Pt has not started [place] do to pending insurance  The insurance is [place] and they require a form to be mailed to them  Jethro Lawton has spoke with the pt about this

## 2017-05-19 NOTE — TELEPHONE ENCOUNTER
Spoke to Rhona Valentino at [facility], they have been unable to get authorization from the pt's insurance.  has mailed the form and orders as required by insurance but they have not responded.    Rhona Valentino is concerned as she feels the pt is in need of home health c

## 2017-05-22 ENCOUNTER — TELEPHONE (OUTPATIENT)
Dept: GASTROENTEROLOGY | Facility: CLINIC | Age: 82
End: 2017-05-22

## 2017-05-22 ENCOUNTER — TELEPHONE (OUTPATIENT)
Dept: INTERNAL MEDICINE CLINIC | Facility: CLINIC | Age: 82
End: 2017-05-22

## 2017-05-22 NOTE — TELEPHONE ENCOUNTER
I spoke with this patient son to confirm date, time and location of the procedure which he verbally understood.  I also called and left a message to call back, Barbara Armendariz with [phone] to inform him of the date and time of this procedure and to make

## 2017-05-22 NOTE — TELEPHONE ENCOUNTER
Scheduled for:  ERCP  Provider Name:  Dr. Nilesh Anne  Date:  5/24/17  Location:  [facility]  Sedation:  General  Time:  0800  Prep:  NPO after midnight  Meds/Allergies Reconciled?:  Physician reviewed  Diagnosis with codes:  Choledocholithiasis K80.5  Was patient inf

## 2017-05-22 NOTE — TELEPHONE ENCOUNTER
Dr. Amish aGrcia scheduled this procedure but the time has changed since I could not schedule MAC at 0900 but 0800 was available. I hope this was okay. See below for more information. Please advise if there is anything else I need to do for this procedure.

## 2017-05-22 NOTE — TELEPHONE ENCOUNTER
Dr. Clarke Gallegos-- spoke with Brenda Shelton in Endo to check on availability for Wed. 5/24/17 which at this time there is not available for this date but once the block drops on Monday 5/22/17 there is a good possibility that we can schedule 2 hours for this ERCP w

## 2017-05-22 NOTE — TELEPHONE ENCOUNTER
Dr Ryan Gomez, please see message below. Per Jean Claude Castañeda, they are unable to get authorization from RONY Armas of [facility].  Unable to see pt.

## 2017-05-23 ENCOUNTER — OFFICE VISIT (OUTPATIENT)
Dept: INTERNAL MEDICINE CLINIC | Facility: CLINIC | Age: 82
End: 2017-05-23

## 2017-05-23 VITALS
SYSTOLIC BLOOD PRESSURE: 104 MMHG | TEMPERATURE: 99 F | HEART RATE: 90 BPM | WEIGHT: 124.19 LBS | BODY MASS INDEX: 20 KG/M2 | DIASTOLIC BLOOD PRESSURE: 66 MMHG

## 2017-05-23 DIAGNOSIS — Z79.4 UNCONTROLLED TYPE 2 DIABETES MELLITUS WITH COMPLICATION, WITH LONG-TERM CURRENT USE OF INSULIN (HCC): ICD-10-CM

## 2017-05-23 DIAGNOSIS — E11.8 UNCONTROLLED TYPE 2 DIABETES MELLITUS WITH COMPLICATION, WITH LONG-TERM CURRENT USE OF INSULIN (HCC): ICD-10-CM

## 2017-05-23 DIAGNOSIS — A41.51 E. COLI SEPSIS (HCC): Primary | ICD-10-CM

## 2017-05-23 DIAGNOSIS — F03.90 DEMENTIA WITHOUT BEHAVIORAL DISTURBANCE, UNSPECIFIED DEMENTIA TYPE (HCC): ICD-10-CM

## 2017-05-23 DIAGNOSIS — E11.65 UNCONTROLLED TYPE 2 DIABETES MELLITUS WITH COMPLICATION, WITH LONG-TERM CURRENT USE OF INSULIN (HCC): ICD-10-CM

## 2017-05-23 DIAGNOSIS — I10 ESSENTIAL HYPERTENSION: ICD-10-CM

## 2017-05-23 DIAGNOSIS — K80.42 CHOLEDOCHOLITHIASIS WITH ACUTE CHOLECYSTITIS: ICD-10-CM

## 2017-05-23 PROCEDURE — G0463 HOSPITAL OUTPT CLINIC VISIT: HCPCS | Performed by: INTERNAL MEDICINE

## 2017-05-23 PROCEDURE — 99214 OFFICE O/P EST MOD 30 MIN: CPT | Performed by: INTERNAL MEDICINE

## 2017-05-23 RX ORDER — DONEPEZIL HYDROCHLORIDE 5 MG/1
5 TABLET, FILM COATED ORAL NIGHTLY
Qty: 90 TABLET | Refills: 1 | Status: SHIPPED | OUTPATIENT
Start: 2017-05-23 | End: 2017-10-26

## 2017-05-23 RX ORDER — GLIPIZIDE 5 MG/1
5 TABLET ORAL
Qty: 180 TABLET | Refills: 1 | Status: SHIPPED | OUTPATIENT
Start: 2017-05-23 | End: 2017-10-26

## 2017-05-23 RX ORDER — LISINOPRIL 20 MG/1
20 TABLET ORAL DAILY
Qty: 90 TABLET | Refills: 1 | Status: SHIPPED | OUTPATIENT
Start: 2017-05-23 | End: 2017-10-26

## 2017-05-23 RX ORDER — INSULIN ASPART 100 [IU]/ML
INJECTION, SOLUTION INTRAVENOUS; SUBCUTANEOUS
Refills: 0 | Status: CANCELLED | OUTPATIENT
Start: 2017-05-23

## 2017-05-23 RX ORDER — URSODIOL 300 MG/1
300 CAPSULE ORAL DAILY
COMMUNITY
End: 2017-05-23

## 2017-05-23 RX ORDER — URSODIOL 300 MG/1
300 CAPSULE ORAL DAILY
Qty: 30 CAPSULE | Refills: 3 | Status: SHIPPED | OUTPATIENT
Start: 2017-05-23 | End: 2017-10-16

## 2017-05-23 RX ORDER — INSULIN ASPART 100 [IU]/ML
INJECTION, SOLUTION INTRAVENOUS; SUBCUTANEOUS
COMMUNITY
End: 2017-05-23

## 2017-05-23 RX ORDER — NAPROXEN SODIUM 220 MG
TABLET ORAL
COMMUNITY
End: 2017-06-20

## 2017-05-23 RX ORDER — GLIPIZIDE 5 MG/1
5 TABLET ORAL
COMMUNITY
End: 2017-05-23

## 2017-05-23 RX ORDER — INSULIN ASPART 100 [IU]/ML
INJECTION, SOLUTION INTRAVENOUS; SUBCUTANEOUS
Qty: 1 VIAL | Refills: 5 | Status: SHIPPED | OUTPATIENT
Start: 2017-05-23 | End: 2017-10-16

## 2017-05-23 NOTE — PROGRESS NOTES
HPI:    Patient ID: Calista Montaño is a 80year old male. Patient arrives to office in care of son. ER F/U for e coli sepsis and choledocholithiasis   Patient was seen in [address] [place] [address] and discharged on 4/4/17.  Discharge diagnoses include cholangitis, choledoch for diarrhea, constipation and blood in stool. Endocrine: Negative for polydipsia, polyphagia and polyuria. Musculoskeletal: Negative for neck pain.      HISTORY:  Past Medical History   Diagnosis Date   • Type II or unspecified type diabetes mellitus w distress. HENT:   Head: Normocephalic and atraumatic. Eyes: EOM are normal.   Neck: Normal range of motion. Pulmonary/Chest: Effort normal. No respiratory distress. Musculoskeletal: Normal range of motion. Neurological: He is alert.    Skin: Skin denies any associated symptoms such as polydipsia, polyuria, or polyphagia. Patient is currently taking glipizide 5 mg and insulin with improvement and was instructed to continue medication as prescribed.  Patient also prescribed glucose monitoring device a were at my direction and in my presence. I have reviewed the chart and discharge instructions (if applicable) and agree that the record reflects my personal performance and is accurate and complete.   Marlee Velasquez MD, 5/23/2017, 5:26 PM

## 2017-05-24 ENCOUNTER — APPOINTMENT (OUTPATIENT)
Dept: GENERAL RADIOLOGY | Facility: HOSPITAL | Age: 82
End: 2017-05-24
Attending: INTERNAL MEDICINE
Payer: COMMERCIAL

## 2017-05-24 ENCOUNTER — HOSPITAL ENCOUNTER (OUTPATIENT)
Facility: HOSPITAL | Age: 82
Setting detail: HOSPITAL OUTPATIENT SURGERY
Discharge: HOME | End: 2017-05-24
Attending: INTERNAL MEDICINE | Admitting: INTERNAL MEDICINE
Payer: COMMERCIAL

## 2017-05-24 ENCOUNTER — ANESTHESIA EVENT (OUTPATIENT)
Dept: ENDOSCOPY | Facility: HOSPITAL | Age: 82
End: 2017-05-24
Payer: COMMERCIAL

## 2017-05-24 ENCOUNTER — ANESTHESIA (OUTPATIENT)
Dept: ENDOSCOPY | Facility: HOSPITAL | Age: 82
End: 2017-05-24
Payer: COMMERCIAL

## 2017-05-24 ENCOUNTER — SURGERY (OUTPATIENT)
Age: 82
End: 2017-05-24

## 2017-05-24 ENCOUNTER — TELEPHONE (OUTPATIENT)
Dept: GASTROENTEROLOGY | Facility: CLINIC | Age: 82
End: 2017-05-24

## 2017-05-24 DIAGNOSIS — K80.50 CHOLEDOCHOLITHIASIS: Primary | ICD-10-CM

## 2017-05-24 DIAGNOSIS — K83.1 BILE DUCT STRICTURE: ICD-10-CM

## 2017-05-24 PROCEDURE — 43265 ERCP LITHOTRIPSY CALCULI: CPT | Performed by: INTERNAL MEDICINE

## 2017-05-24 PROCEDURE — 0FB98ZX EXCISION OF COMMON BILE DUCT, VIA NATURAL OR ARTIFICIAL OPENING ENDOSCOPIC, DIAGNOSTIC: ICD-10-PCS | Performed by: INTERNAL MEDICINE

## 2017-05-24 PROCEDURE — 0FPB8DZ REMOVAL OF INTRALUMINAL DEVICE FROM HEPATOBILIARY DUCT, VIA NATURAL OR ARTIFICIAL OPENING ENDOSCOPIC: ICD-10-PCS | Performed by: INTERNAL MEDICINE

## 2017-05-24 PROCEDURE — 0FC98ZZ EXTIRPATION OF MATTER FROM COMMON BILE DUCT, VIA NATURAL OR ARTIFICIAL OPENING ENDOSCOPIC: ICD-10-PCS | Performed by: INTERNAL MEDICINE

## 2017-05-24 PROCEDURE — 74330 X-RAY BILE/PANC ENDOSCOPY: CPT | Performed by: INTERNAL MEDICINE

## 2017-05-24 PROCEDURE — 43261 ENDO CHOLANGIOPANCREATOGRAPH: CPT | Performed by: INTERNAL MEDICINE

## 2017-05-24 RX ORDER — SODIUM CHLORIDE, SODIUM LACTATE, POTASSIUM CHLORIDE, CALCIUM CHLORIDE 600; 310; 30; 20 MG/100ML; MG/100ML; MG/100ML; MG/100ML
INJECTION, SOLUTION INTRAVENOUS CONTINUOUS PRN
Status: DISCONTINUED | OUTPATIENT
Start: 2017-05-24 | End: 2017-05-24 | Stop reason: SURG

## 2017-05-24 RX ORDER — SODIUM CHLORIDE, SODIUM LACTATE, POTASSIUM CHLORIDE, CALCIUM CHLORIDE 600; 310; 30; 20 MG/100ML; MG/100ML; MG/100ML; MG/100ML
INJECTION, SOLUTION INTRAVENOUS CONTINUOUS
Status: DISCONTINUED | OUTPATIENT
Start: 2017-05-24 | End: 2017-05-24

## 2017-05-24 RX ORDER — PHENYLEPHRINE HCL 10 MG/ML
VIAL (ML) INJECTION AS NEEDED
Status: DISCONTINUED | OUTPATIENT
Start: 2017-05-24 | End: 2017-05-24 | Stop reason: SURG

## 2017-05-24 RX ORDER — ROCURONIUM BROMIDE 10 MG/ML
INJECTION, SOLUTION INTRAVENOUS AS NEEDED
Status: DISCONTINUED | OUTPATIENT
Start: 2017-05-24 | End: 2017-05-24 | Stop reason: SURG

## 2017-05-24 RX ORDER — MIDAZOLAM HYDROCHLORIDE 1 MG/ML
1 INJECTION INTRAMUSCULAR; INTRAVENOUS EVERY 5 MIN PRN
Status: DISCONTINUED | OUTPATIENT
Start: 2017-05-24 | End: 2017-05-24 | Stop reason: HOSPADM

## 2017-05-24 RX ORDER — SODIUM CHLORIDE 0.9 % (FLUSH) 0.9 %
10 SYRINGE (ML) INJECTION AS NEEDED
Status: DISCONTINUED | OUTPATIENT
Start: 2017-05-24 | End: 2017-05-24

## 2017-05-24 RX ORDER — SUCCINYLCHOLINE CHLORIDE 20 MG/ML
INJECTION INTRAMUSCULAR; INTRAVENOUS AS NEEDED
Status: DISCONTINUED | OUTPATIENT
Start: 2017-05-24 | End: 2017-05-24 | Stop reason: SURG

## 2017-05-24 RX ORDER — SODIUM CHLORIDE 9 MG/ML
INJECTION, SOLUTION INTRAVENOUS CONTINUOUS
Status: DISCONTINUED | OUTPATIENT
Start: 2017-05-24 | End: 2017-05-24

## 2017-05-24 RX ORDER — ONDANSETRON 2 MG/ML
INJECTION INTRAMUSCULAR; INTRAVENOUS AS NEEDED
Status: DISCONTINUED | OUTPATIENT
Start: 2017-05-24 | End: 2017-05-24 | Stop reason: SURG

## 2017-05-24 RX ORDER — LIDOCAINE HYDROCHLORIDE 10 MG/ML
INJECTION, SOLUTION EPIDURAL; INFILTRATION; INTRACAUDAL; PERINEURAL AS NEEDED
Status: DISCONTINUED | OUTPATIENT
Start: 2017-05-24 | End: 2017-05-24 | Stop reason: SURG

## 2017-05-24 RX ORDER — NALOXONE HYDROCHLORIDE 0.4 MG/ML
80 INJECTION, SOLUTION INTRAMUSCULAR; INTRAVENOUS; SUBCUTANEOUS AS NEEDED
Status: DISCONTINUED | OUTPATIENT
Start: 2017-05-24 | End: 2017-05-24

## 2017-05-24 RX ADMIN — LIDOCAINE HYDROCHLORIDE 50 MG: 10 INJECTION, SOLUTION EPIDURAL; INFILTRATION; INTRACAUDAL; PERINEURAL at 08:45:00

## 2017-05-24 RX ADMIN — ONDANSETRON 4 MG: 2 INJECTION INTRAMUSCULAR; INTRAVENOUS at 10:35:00

## 2017-05-24 RX ADMIN — PHENYLEPHRINE HCL 100 MCG: 10 MG/ML VIAL (ML) INJECTION at 08:56:00

## 2017-05-24 RX ADMIN — ROCURONIUM BROMIDE 5 MG: 10 INJECTION, SOLUTION INTRAVENOUS at 08:45:00

## 2017-05-24 RX ADMIN — SUCCINYLCHOLINE CHLORIDE 60 MG: 20 INJECTION INTRAMUSCULAR; INTRAVENOUS at 08:45:00

## 2017-05-24 RX ADMIN — SODIUM CHLORIDE, SODIUM LACTATE, POTASSIUM CHLORIDE, CALCIUM CHLORIDE: 600; 310; 30; 20 INJECTION, SOLUTION INTRAVENOUS at 08:41:00

## 2017-05-24 NOTE — TELEPHONE ENCOUNTER
GI RNs–  Please recall for repeat MRI MRCP abdomen with IV contrast in 6 months.   Diagnosis =  follow-up of biliary stricture May 2017; rule out malignant stricture

## 2017-05-24 NOTE — ANESTHESIA POSTPROCEDURE EVALUATION
Patient: Ashkan Matt    Procedure Summary     Date Anesthesia Start Anesthesia Stop Room / Location    05/24/17 0841 1055 300 [facility] ENDOSCOPY 01 / 300 [facility] ENDOSCOPY       Procedure Diagnosis Surgeon Responsible Provider    ENDOSCOPIC RETROGRADE CHOLANGIOPANCREATOGR

## 2017-05-24 NOTE — ANESTHESIA PREPROCEDURE EVALUATION
Anesthesia PreOp Note    HPI:     aRghavendra Michele is a 80year old male who presents for preoperative consultation requested by: Jennifer Carter MD    Date of Surgery: 5/24/2017    Procedure(s):  ENDOSCOPIC RETROGRADE CHOLANGIOPANCREATOGRAPHY (ERCP) Subcutaneous Solution Inject 18 units at bedtime Disp: 1 vial Rfl: 5 5/23/2017   insulin aspart (NOVOLOG) 100 UNIT/ML Subcutaneous Solution Inject 7 units three times daily before meals Disp: 1 vial Rfl: 5 5/23/2017   Glucose Blood (EMBRACE BLOOD GLUCOSE T Physical Exam     Patient summary reviewed and Nursing notes reviewed    No history of anesthetic complications   Airway   Mallampati: II  TM distance: >3 FB  Neck ROM: full  Dental    (+) upper dentures and lower dentures    Pulmonary - negative ROS and n

## 2017-05-24 NOTE — BRIEF OP NOTE
Pre-Operative Diagnosis: Choledocholithiasis     Post-Operative Diagnosis: See impression.      Procedure Performed:   Procedure(s):  ENDOSCOPIC RETROGRADE CHOLANGIOPANCREATOGRAPHY with \"Spyglass\" cholangioscopy, lithotripsy, biopsy of bile duct strict

## 2017-05-24 NOTE — H&P
History & Physical Examination    Patient Name: Mustapha Oliveira  MRN: [id number]  CSN: [id number]  YOB: 1932    Diagnosis: Choledocholithiasis    Present Illness: History of recent choledocholithiasis, cholangitis and ERCP 5/1/17 with incomplete Unspecified essential hypertension    • Cholelithiasis    • Choledocholithiasis    • TIA (transient ischemic attack)    • Dementia    • Subdural hematoma (HCC)    • High blood pressure          Past Surgical History    OTHER SURGICAL HISTORY  2000    Comme

## 2017-05-25 VITALS
HEIGHT: 67 IN | SYSTOLIC BLOOD PRESSURE: 146 MMHG | RESPIRATION RATE: 16 BRPM | OXYGEN SATURATION: 97 % | TEMPERATURE: 97 F | HEART RATE: 70 BPM | BODY MASS INDEX: 19.46 KG/M2 | WEIGHT: 124 LBS | DIASTOLIC BLOOD PRESSURE: 77 MMHG

## 2017-05-25 RX ORDER — LANCETS 28 GAUGE
EACH MISCELLANEOUS
Qty: 300 EACH | Refills: 0 | Status: SHIPPED | OUTPATIENT
Start: 2017-05-25

## 2017-05-25 RX ORDER — BLOOD-GLUCOSE METER
KIT MISCELLANEOUS
Qty: 1 DEVICE | Refills: 0 | Status: SHIPPED | OUTPATIENT
Start: 2017-05-25

## 2017-06-05 ENCOUNTER — TELEPHONE (OUTPATIENT)
Dept: GASTROENTEROLOGY | Facility: CLINIC | Age: 82
End: 2017-06-05

## 2017-06-20 RX ORDER — SYRINGE-NEEDLE,INSULIN,0.5 ML 31 GX5/16"
SYRINGE, EMPTY DISPOSABLE MISCELLANEOUS
Qty: 100 EACH | Refills: 2 | Status: SHIPPED | OUTPATIENT
Start: 2017-06-20 | End: 2017-06-24

## 2017-06-20 NOTE — TELEPHONE ENCOUNTER
Refill Protocol Appointment Criteria:Refilled per protocol    · Appointment scheduled in the past 12 months or in the next 3 months  Recent Visits       Provider Department Primary Dx    4 weeks ago Se Mahmood MD [facility], [address]

## 2017-06-24 ENCOUNTER — OFFICE VISIT (OUTPATIENT)
Dept: INTERNAL MEDICINE CLINIC | Facility: CLINIC | Age: 82
End: 2017-06-24

## 2017-06-24 VITALS
DIASTOLIC BLOOD PRESSURE: 60 MMHG | HEIGHT: 67 IN | BODY MASS INDEX: 20.25 KG/M2 | WEIGHT: 129 LBS | HEART RATE: 98 BPM | TEMPERATURE: 98 F | SYSTOLIC BLOOD PRESSURE: 99 MMHG

## 2017-06-24 DIAGNOSIS — E11.9 CONTROLLED TYPE 2 DIABETES MELLITUS WITHOUT COMPLICATION, WITHOUT LONG-TERM CURRENT USE OF INSULIN (HCC): Primary | ICD-10-CM

## 2017-06-24 DIAGNOSIS — I10 ESSENTIAL HYPERTENSION: ICD-10-CM

## 2017-06-24 PROCEDURE — 99214 OFFICE O/P EST MOD 30 MIN: CPT | Performed by: INTERNAL MEDICINE

## 2017-06-24 PROCEDURE — G0463 HOSPITAL OUTPT CLINIC VISIT: HCPCS | Performed by: INTERNAL MEDICINE

## 2017-06-24 RX ORDER — BLOOD SUGAR DIAGNOSTIC
STRIP MISCELLANEOUS
Qty: 100 EACH | Refills: 2 | Status: SHIPPED | OUTPATIENT
Start: 2017-06-24 | End: 2017-10-26

## 2017-06-24 NOTE — PROGRESS NOTES
HPI:    Patient ID: Karely Beach is a 80year old male. History provided by patient and son. Hypertension   This is a chronic problem. The problem is controlled.  Pertinent negatives include no anxiety, chest pain, peripheral edema or shortness of br brain   Family History   Problem Relation Age of Onset   • Heart Disorder Mother    • Hypertension Mother       Smoking status: Former Smoker                                                              Packs/day: 0.75      Years: 10.00        Types: Cigar normal.   Mouth/Throat: Oropharynx is clear and moist.   Eyes: Conjunctivae and EOM are normal.   Neck: Normal range of motion. Neck supple. No thyromegaly present. Cardiovascular: Normal rate, regular rhythm and normal heart sounds. No murmur heard. Patient will continue present medications : Novalog 7 units TID,  Lantus 18 units nightly and Glipizide 5 mg.   Diabetic foot exam: onychomycosis; small hammertoes bilaterally; pre-callus on right foot; mild deformity in right 5th toe; slightly decreased pu

## 2017-09-21 NOTE — TELEPHONE ENCOUNTER
Dr. Ji Alicia, Rx request for Lantus 100 unit/ml, FAILED Diabetes Protocol. UNABLE to fill Per protocol. Please Review. Thank you.     Diabetes Medications  Protocol Criteria:  · Appointment scheduled in the past 6 months or the next 3 months  · A1C < 7.5 in

## 2017-10-01 RX ORDER — INSULIN GLARGINE 100 [IU]/ML
INJECTION, SOLUTION SUBCUTANEOUS
Qty: 10 ML | Refills: 11 | Status: SHIPPED | OUTPATIENT
Start: 2017-10-01 | End: 2017-10-26

## 2017-10-16 DIAGNOSIS — K80.42 CHOLEDOCHOLITHIASIS WITH ACUTE CHOLECYSTITIS: ICD-10-CM

## 2017-10-18 RX ORDER — INSULIN ASPART 100 [IU]/ML
INJECTION, SOLUTION INTRAVENOUS; SUBCUTANEOUS
Qty: 10 ML | Refills: 4 | Status: SHIPPED | OUTPATIENT
Start: 2017-10-18 | End: 2017-10-26

## 2017-10-18 RX ORDER — URSODIOL 300 MG/1
CAPSULE ORAL
Qty: 30 CAPSULE | Refills: 0 | Status: SHIPPED | OUTPATIENT
Start: 2017-10-18 | End: 2017-10-26

## 2017-10-18 NOTE — TELEPHONE ENCOUNTER
Please advise on refill request   HA1 C is high        Diabetes Medications  Protocol Criteria:  · Appointment scheduled in the past 6 months or the next 3 months  · A1C < 7.5 in the past 6 months  · Creatinine in the past 12 months  · Creatinine result <

## 2017-10-26 ENCOUNTER — TELEPHONE (OUTPATIENT)
Dept: OTHER | Age: 82
End: 2017-10-26

## 2017-10-26 ENCOUNTER — OFFICE VISIT (OUTPATIENT)
Dept: FAMILY MEDICINE CLINIC | Facility: CLINIC | Age: 82
End: 2017-10-26

## 2017-10-26 ENCOUNTER — LAB ENCOUNTER (OUTPATIENT)
Dept: LAB | Age: 82
End: 2017-10-26
Attending: FAMILY MEDICINE
Payer: COMMERCIAL

## 2017-10-26 VITALS
SYSTOLIC BLOOD PRESSURE: 138 MMHG | HEART RATE: 97 BPM | BODY MASS INDEX: 22.86 KG/M2 | DIASTOLIC BLOOD PRESSURE: 74 MMHG | WEIGHT: 137.19 LBS | HEIGHT: 65 IN | TEMPERATURE: 99 F

## 2017-10-26 DIAGNOSIS — Z79.4 UNCONTROLLED TYPE 2 DIABETES MELLITUS WITH COMPLICATION, WITH LONG-TERM CURRENT USE OF INSULIN (HCC): Primary | ICD-10-CM

## 2017-10-26 DIAGNOSIS — F03.90 DEMENTIA WITHOUT BEHAVIORAL DISTURBANCE, UNSPECIFIED DEMENTIA TYPE (HCC): ICD-10-CM

## 2017-10-26 DIAGNOSIS — Z79.4 UNCONTROLLED TYPE 2 DIABETES MELLITUS WITH COMPLICATION, WITH LONG-TERM CURRENT USE OF INSULIN (HCC): ICD-10-CM

## 2017-10-26 DIAGNOSIS — E11.65 UNCONTROLLED TYPE 2 DIABETES MELLITUS WITH COMPLICATION, WITH LONG-TERM CURRENT USE OF INSULIN (HCC): ICD-10-CM

## 2017-10-26 DIAGNOSIS — E11.8 UNCONTROLLED TYPE 2 DIABETES MELLITUS WITH COMPLICATION, WITH LONG-TERM CURRENT USE OF INSULIN (HCC): Primary | ICD-10-CM

## 2017-10-26 DIAGNOSIS — E11.8 UNCONTROLLED TYPE 2 DIABETES MELLITUS WITH COMPLICATION, WITH LONG-TERM CURRENT USE OF INSULIN (HCC): ICD-10-CM

## 2017-10-26 DIAGNOSIS — I10 ESSENTIAL HYPERTENSION: ICD-10-CM

## 2017-10-26 DIAGNOSIS — E11.65 UNCONTROLLED TYPE 2 DIABETES MELLITUS WITH COMPLICATION, WITH LONG-TERM CURRENT USE OF INSULIN (HCC): Primary | ICD-10-CM

## 2017-10-26 PROCEDURE — 36415 COLL VENOUS BLD VENIPUNCTURE: CPT

## 2017-10-26 PROCEDURE — 83036 HEMOGLOBIN GLYCOSYLATED A1C: CPT

## 2017-10-26 PROCEDURE — 82043 UR ALBUMIN QUANTITATIVE: CPT

## 2017-10-26 PROCEDURE — 99204 OFFICE O/P NEW MOD 45 MIN: CPT | Performed by: FAMILY MEDICINE

## 2017-10-26 PROCEDURE — 80061 LIPID PANEL: CPT

## 2017-10-26 PROCEDURE — 84443 ASSAY THYROID STIM HORMONE: CPT

## 2017-10-26 PROCEDURE — 82570 ASSAY OF URINE CREATININE: CPT

## 2017-10-26 PROCEDURE — 80053 COMPREHEN METABOLIC PANEL: CPT

## 2017-10-26 PROCEDURE — G0463 HOSPITAL OUTPT CLINIC VISIT: HCPCS | Performed by: FAMILY MEDICINE

## 2017-10-26 RX ORDER — URSODIOL 300 MG/1
300 CAPSULE ORAL
Qty: 30 CAPSULE | Refills: 0 | Status: SHIPPED | OUTPATIENT
Start: 2017-10-26 | End: 2018-01-01

## 2017-10-26 RX ORDER — GLIPIZIDE 5 MG/1
5 TABLET ORAL
Qty: 180 TABLET | Refills: 1 | Status: SHIPPED | OUTPATIENT
Start: 2017-10-26 | End: 2017-10-26

## 2017-10-26 RX ORDER — BLOOD SUGAR DIAGNOSTIC
STRIP MISCELLANEOUS
Qty: 100 EACH | Refills: 2 | Status: SHIPPED | OUTPATIENT
Start: 2017-10-26

## 2017-10-26 RX ORDER — MEMANTINE HYDROCHLORIDE 14 MG/1
1 CAPSULE, EXTENDED RELEASE ORAL DAILY
Qty: 90 CAPSULE | Refills: 2 | Status: CANCELLED | OUTPATIENT
Start: 2017-10-26

## 2017-10-26 RX ORDER — LISINOPRIL 20 MG/1
20 TABLET ORAL DAILY
Qty: 90 TABLET | Refills: 1 | Status: SHIPPED | OUTPATIENT
Start: 2017-10-26 | End: 2018-01-01

## 2017-10-26 RX ORDER — DONEPEZIL HYDROCHLORIDE 5 MG/1
5 TABLET, FILM COATED ORAL NIGHTLY
Qty: 90 TABLET | Refills: 1 | Status: SHIPPED | OUTPATIENT
Start: 2017-10-26 | End: 2018-01-01

## 2017-10-26 RX ORDER — INSULIN ASPART 100 [IU]/ML
INJECTION, SOLUTION INTRAVENOUS; SUBCUTANEOUS
Qty: 10 ML | Refills: 4 | Status: SHIPPED | OUTPATIENT
Start: 2017-10-26 | End: 2018-01-01

## 2017-10-26 RX ORDER — GLIPIZIDE 5 MG/1
5 TABLET ORAL
Qty: 180 TABLET | Refills: 1 | Status: SHIPPED | OUTPATIENT
Start: 2017-10-26 | End: 2018-01-01

## 2017-10-26 RX ORDER — MEMANTINE HYDROCHLORIDE 14 MG/1
1 CAPSULE, EXTENDED RELEASE ORAL DAILY
Qty: 90 CAPSULE | Refills: 2 | Status: SHIPPED | OUTPATIENT
Start: 2017-10-26 | End: 2018-01-01

## 2017-10-26 NOTE — PROGRESS NOTES
10/26/2017  10:10 AM    Mustapha Oliveira is a 80year old male.     Chief complaint(s): Patient presents with:  Establish Care: patient is a former patient of Dr. Maria E Eldridge who is here today requesting refills on his medications, 90 day supply  Medication Request present for a follow up regarding her alzheimer's dementia. The history is obtained from his son. The alzheimer's dementia is reported to have been present for more than 3 years. Patient's mental status appears to be gradually deteriorating.   At his c Prescriptions:  BD INSULIN SYRINGE ULTRAFINE 31G X 15/64\" 0.5 ML Does not apply Misc USE 4 TIMES DAILY Disp:  Rfl: 2   lisinopril 20 MG Oral Tab Take 1 tablet (20 mg total) by mouth daily.  Disp: 90 tablet Rfl: 1   Donepezil HCl 5 MG Oral Tab Take 1 tablet palpitations. Gastrointestinal: Negative for nausea, vomiting, abdominal pain, diarrhea and constipation. Endocrine: Negative for polydipsia and polyuria. Musculoskeletal: Negative for back pain and neck pain. Skin: Negative for rash.    Neurologica ([address]  DIABETES A&P    LABORATORY & ORDERS: Blood test(s) ordered today ;   Orders Placed This Encounter      ** Hemoglobin A1C  [E]      ** Lipid Panel [E]      ** Microalbumin [id number]      **CMP  Comp Metabolic Panel (14) [E]      TSH W Reflex To Free T4 daily, Disp: 60 strip, Rfl: 11. Signed Prescriptions Disp Refills    lisinopril 20 MG Oral Tab 90 tablet 1      Sig: Take 1 tablet (20 mg total) by mouth daily.       Donepezil HCl 5 MG Oral Tab 90 tablet 1      Sig: Take 1 tablet (5 mg total) by mouth nig control and retinopathy, nephropathy, and neuropathy. Advised as to the targets of pre-meal glucoses ( mg/dl) and post meal glucoses (<140-160 mg/dl) Home glucose testing discussed.  The A1c target of <7% according to ADA and <6.5% according to AACE w reduction of dietary salt intake, take medication as prescribed, try not to miss doses, smoking cessation, weight loss, and stress reduction. FOLLOW-UP: Schedule a follow-up visit in 2 months.         3. Dementia without behavioral disturbance, unspecifi Microalbumin [id number]      **CMP  Comp Metabolic Panel (14) [E]      TSH W Reflex To Free T4 [E]    Meds This Visit:    Signed Prescriptions Disp Refills    lisinopril 20 MG Oral Tab 90 tablet 1      Sig: Take 1 tablet (20 mg total) by mouth daily.       Yajaira Watt

## 2017-10-27 ENCOUNTER — TELEPHONE (OUTPATIENT)
Dept: FAMILY MEDICINE CLINIC | Facility: CLINIC | Age: 82
End: 2017-10-27

## 2017-10-27 RX ORDER — MEMANTINE HYDROCHLORIDE 14 MG/1
1 CAPSULE, EXTENDED RELEASE ORAL DAILY
Qty: 90 CAPSULE | Refills: 2 | Status: CANCELLED | OUTPATIENT
Start: 2017-10-27

## 2017-10-27 NOTE — TELEPHONE ENCOUNTER
see your direction on the medication that was send to the pharmacy. Ok to change to take 1 cap? Memantine HCl ER (NAMENDA XR) 14 MG Oral Capsule SR 24 Hr 90 capsule 2 10/26/2017    Sig :  Take 0.0714 capsules (1 mg total) by mouth daily.

## 2017-10-27 NOTE — TELEPHONE ENCOUNTER
----- Message from Amanda Prince MD sent at 10/26/2017  6:01 PM CDT -----  Please call patient, results are within normal limits.  CPM , KPA

## 2017-10-30 ENCOUNTER — TELEPHONE (OUTPATIENT)
Dept: GASTROENTEROLOGY | Facility: CLINIC | Age: 82
End: 2017-10-30

## 2017-10-30 DIAGNOSIS — R93.89 ABNORMAL MRI: Primary | ICD-10-CM

## 2017-10-30 DIAGNOSIS — K86.9 PANCREATIC LESION: ICD-10-CM

## 2017-10-30 DIAGNOSIS — K83.1 BILIARY STRICTURE: ICD-10-CM

## 2017-10-30 NOTE — TELEPHONE ENCOUNTER
Dev Tanner MD   Em Gi Clinical Staff   57 minutes ago   (12:13 PM)       GI RNs–   Please recall for repeat MRI MRCP abdomen with IV contrast in 6 months.    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

## 2017-11-01 NOTE — TELEPHONE ENCOUNTER
Rodrigue(pt's son) was notified that Gold Carla will call him when the MRI is approved to schedule an appt.  Pt doesn't speak [place]

## 2017-11-17 DIAGNOSIS — K80.42 CHOLEDOCHOLITHIASIS WITH ACUTE CHOLECYSTITIS: ICD-10-CM

## 2017-11-17 RX ORDER — URSODIOL 300 MG/1
CAPSULE ORAL
Qty: 90 CAPSULE | Refills: 0 | Status: SHIPPED | OUTPATIENT
Start: 2017-11-17 | End: 2018-01-01

## 2017-11-17 NOTE — TELEPHONE ENCOUNTER
Signed Prescriptions Disp Refills    URSODIOL 300 MG Oral Cap 90 capsule 0      Sig: TAKE ONE CAPSULE BY MOUTH EVERY DAY        Authorizing Provider: Walter Islas        Ordering User: Janet Stinson           Refill approved per protocol.       Servando

## 2017-11-18 NOTE — TELEPHONE ENCOUNTER
Future Appointments  Date Time Provider Joni Guajardo   11/30/2017 10:00 AM Lonnie Kearns MD [facility] ADO

## 2017-11-30 ENCOUNTER — OFFICE VISIT (OUTPATIENT)
Dept: FAMILY MEDICINE CLINIC | Facility: CLINIC | Age: 82
End: 2017-11-30

## 2017-11-30 VITALS
SYSTOLIC BLOOD PRESSURE: 125 MMHG | DIASTOLIC BLOOD PRESSURE: 72 MMHG | HEART RATE: 112 BPM | TEMPERATURE: 98 F | BODY MASS INDEX: 23 KG/M2 | WEIGHT: 139 LBS

## 2017-11-30 DIAGNOSIS — E11.65 UNCONTROLLED TYPE 2 DIABETES MELLITUS WITH COMPLICATION, WITH LONG-TERM CURRENT USE OF INSULIN (HCC): Primary | ICD-10-CM

## 2017-11-30 DIAGNOSIS — Z79.4 UNCONTROLLED TYPE 2 DIABETES MELLITUS WITH COMPLICATION, WITH LONG-TERM CURRENT USE OF INSULIN (HCC): Primary | ICD-10-CM

## 2017-11-30 DIAGNOSIS — E11.8 UNCONTROLLED TYPE 2 DIABETES MELLITUS WITH COMPLICATION, WITH LONG-TERM CURRENT USE OF INSULIN (HCC): Primary | ICD-10-CM

## 2017-11-30 DIAGNOSIS — I10 ESSENTIAL HYPERTENSION: ICD-10-CM

## 2017-11-30 DIAGNOSIS — F03.90 DEMENTIA WITHOUT BEHAVIORAL DISTURBANCE, UNSPECIFIED DEMENTIA TYPE (HCC): ICD-10-CM

## 2017-11-30 PROCEDURE — G0008 ADMIN INFLUENZA VIRUS VAC: HCPCS | Performed by: FAMILY MEDICINE

## 2017-11-30 PROCEDURE — G0009 ADMIN PNEUMOCOCCAL VACCINE: HCPCS | Performed by: FAMILY MEDICINE

## 2017-11-30 PROCEDURE — G0463 HOSPITAL OUTPT CLINIC VISIT: HCPCS | Performed by: FAMILY MEDICINE

## 2017-11-30 PROCEDURE — 90653 IIV ADJUVANT VACCINE IM: CPT | Performed by: FAMILY MEDICINE

## 2017-11-30 PROCEDURE — 90732 PPSV23 VACC 2 YRS+ SUBQ/IM: CPT | Performed by: FAMILY MEDICINE

## 2017-11-30 PROCEDURE — 99214 OFFICE O/P EST MOD 30 MIN: CPT | Performed by: FAMILY MEDICINE

## 2017-11-30 PROCEDURE — 90472 IMMUNIZATION ADMIN EACH ADD: CPT | Performed by: FAMILY MEDICINE

## 2017-11-30 NOTE — PROGRESS NOTES
11/30/2017  10:01 AM    Gissel Dumont is a 80year old male. Chief complaint(s): Patient presents with: Follow - Up  Diabetes  Test Results    HPI:     Gissel Dumont primary complaint is regarding DM.      Patient Gissel Dumont is a 80year old male is her present for more than 3 years. Patient's mental status appears to be gradually deteriorating.   At his current level of functioning, he can bathe, control her bladder, control her bowel function, dress himself, feed himself, converse in a meaningful manner 300 MG Oral Cap TAKE ONE CAPSULE BY MOUTH EVERY DAY Disp: 90 capsule Rfl: 0   BD INSULIN SYRINGE ULTRAFINE 31G X 15/64\" 0.5 ML Does not apply Misc USE 4 TIMES DAILY Disp:  Rfl: 2   lisinopril 20 MG Oral Tab Take 1 tablet (20 mg total) by mouth daily.  Disp for nausea, vomiting, abdominal pain, diarrhea and constipation. Endocrine: Negative for polydipsia and polyuria. Musculoskeletal: Negative for back pain and neck pain. Skin: Negative for rash. Neurological: Negative for seizures and headaches. 21 (H) 8 - 20 mg/dL   Creatinine 1.37 0.50 - 1.50 mg/dL   Calcium, Total 9.6 8.5 - 10.5 mg/dL   ALT 13 (L) 17 - 63 U/L   AST 22 15 - 41 U/L   Alkaline Phosphatase 88 32 - 100 U/L   Bilirubin, Total 0.3 0.3 - 1.2 mg/dL   Total Protein 7.2 5.9 - 8.4 g/dL   A UNUSED PORTION AFTER 28 DAYS, Disp: 10 mL, Rfl: 4  •  Insulin Syringe-Needle U-100 (BD INSULIN SYR ULTRAFINE II) 31G X 5/16\" 0.5 ML Does not apply Misc, USE 4 TIMES DAILY, Disp: 100 each, Rfl: 2  •  Glucose Blood In Vitro Strip, Test blood sugar 3 times d according to ADA and <6.5% according to AACE were discussed.         2. Essential hypertension   HTN     MEDICATIONS: CPM  LABORATORY & ORDERS:   Orders Placed This Encounter      Fluad High Dose 72 ys and older      PNEUMOCOCCAL IMM, 23  RECOMMENDATIONS gi

## 2017-12-18 NOTE — TELEPHONE ENCOUNTER
Refill Protocol Appointment Criteria  · Appointment scheduled in the past 12 months or in the next 3 months  Recent Outpatient Visits            2 weeks ago Uncontrolled type 2 diabetes mellitus with complication, with long-term current use of insulin [facility]

## 2017-12-19 NOTE — TELEPHONE ENCOUNTER
Refill Protocol Appointment Criteria  · Appointment scheduled in the past 12 months or in the next 3 months  Recent Outpatient Visits            2 weeks ago Uncontrolled type 2 diabetes mellitus with complication, with long-term current use of insulin [facility]

## 2018-01-01 ENCOUNTER — OFFICE VISIT (OUTPATIENT)
Dept: FAMILY MEDICINE CLINIC | Facility: CLINIC | Age: 83
End: 2018-01-01
Payer: MEDICARE

## 2018-01-01 ENCOUNTER — LAB ENCOUNTER (OUTPATIENT)
Dept: LAB | Age: 83
End: 2018-01-01
Attending: FAMILY MEDICINE
Payer: MEDICARE

## 2018-01-01 ENCOUNTER — OFFICE VISIT (OUTPATIENT)
Dept: FAMILY MEDICINE CLINIC | Facility: CLINIC | Age: 83
End: 2018-01-01

## 2018-01-01 ENCOUNTER — MA CHART PREP (OUTPATIENT)
Dept: FAMILY MEDICINE CLINIC | Facility: CLINIC | Age: 83
End: 2018-01-01

## 2018-01-01 ENCOUNTER — TELEPHONE (OUTPATIENT)
Dept: FAMILY MEDICINE CLINIC | Facility: CLINIC | Age: 83
End: 2018-01-01

## 2018-01-01 ENCOUNTER — TELEPHONE (OUTPATIENT)
Dept: OTHER | Age: 83
End: 2018-01-01

## 2018-01-01 VITALS
WEIGHT: 146 LBS | DIASTOLIC BLOOD PRESSURE: 69 MMHG | TEMPERATURE: 98 F | BODY MASS INDEX: 24 KG/M2 | HEART RATE: 83 BPM | SYSTOLIC BLOOD PRESSURE: 160 MMHG

## 2018-01-01 VITALS
HEART RATE: 93 BPM | WEIGHT: 144 LBS | BODY MASS INDEX: 24 KG/M2 | SYSTOLIC BLOOD PRESSURE: 145 MMHG | TEMPERATURE: 98 F | DIASTOLIC BLOOD PRESSURE: 75 MMHG

## 2018-01-01 DIAGNOSIS — Z79.4 TYPE 2 DIABETES MELLITUS WITH MICROALBUMINURIA, WITH LONG-TERM CURRENT USE OF INSULIN (HCC): ICD-10-CM

## 2018-01-01 DIAGNOSIS — I10 ESSENTIAL HYPERTENSION: ICD-10-CM

## 2018-01-01 DIAGNOSIS — R80.9 TYPE 2 DIABETES MELLITUS WITH MICROALBUMINURIA, WITH LONG-TERM CURRENT USE OF INSULIN (HCC): Primary | ICD-10-CM

## 2018-01-01 DIAGNOSIS — E11.8 UNCONTROLLED TYPE 2 DIABETES MELLITUS WITH COMPLICATION, WITH LONG-TERM CURRENT USE OF INSULIN (HCC): ICD-10-CM

## 2018-01-01 DIAGNOSIS — N39.490 OVERFLOW INCONTINENCE OF URINE: ICD-10-CM

## 2018-01-01 DIAGNOSIS — R80.9 TYPE 2 DIABETES MELLITUS WITH MICROALBUMINURIA, WITH LONG-TERM CURRENT USE OF INSULIN (HCC): ICD-10-CM

## 2018-01-01 DIAGNOSIS — F03.90 DEMENTIA WITHOUT BEHAVIORAL DISTURBANCE, UNSPECIFIED DEMENTIA TYPE (HCC): ICD-10-CM

## 2018-01-01 DIAGNOSIS — E11.29 TYPE 2 DIABETES MELLITUS WITH MICROALBUMINURIA, WITH LONG-TERM CURRENT USE OF INSULIN (HCC): Primary | ICD-10-CM

## 2018-01-01 DIAGNOSIS — Z79.4 TYPE 2 DIABETES MELLITUS WITH MICROALBUMINURIA, WITH LONG-TERM CURRENT USE OF INSULIN (HCC): Primary | ICD-10-CM

## 2018-01-01 DIAGNOSIS — Z79.4 UNCONTROLLED TYPE 2 DIABETES MELLITUS WITH COMPLICATION, WITH LONG-TERM CURRENT USE OF INSULIN (HCC): ICD-10-CM

## 2018-01-01 DIAGNOSIS — E11.29 TYPE 2 DIABETES MELLITUS WITH MICROALBUMINURIA, WITH LONG-TERM CURRENT USE OF INSULIN (HCC): ICD-10-CM

## 2018-01-01 DIAGNOSIS — E11.65 UNCONTROLLED TYPE 2 DIABETES MELLITUS WITH COMPLICATION, WITH LONG-TERM CURRENT USE OF INSULIN (HCC): ICD-10-CM

## 2018-01-01 DIAGNOSIS — S06.5X9A SUBDURAL HEMATOMA (HCC): ICD-10-CM

## 2018-01-01 LAB
ALBUMIN SERPL BCP-MCNC: 3.6 G/DL (ref 3.5–4.8)
ALBUMIN/GLOB SERPL: 1 {RATIO} (ref 1–2)
ALP SERPL-CCNC: 78 U/L (ref 32–100)
ALT SERPL-CCNC: 13 U/L (ref 17–63)
ANION GAP SERPL CALC-SCNC: 8 MMOL/L (ref 0–18)
AST SERPL-CCNC: 22 U/L (ref 15–41)
BACTERIA UR QL AUTO: NEGATIVE /HPF
BASOPHILS # BLD: 0.1 K/UL (ref 0–0.2)
BASOPHILS NFR BLD: 1 %
BILIRUB SERPL-MCNC: 0.4 MG/DL (ref 0.3–1.2)
BUN SERPL-MCNC: 17 MG/DL (ref 8–20)
BUN/CREAT SERPL: 11.3 (ref 10–20)
CALCIUM SERPL-MCNC: 9.3 MG/DL (ref 8.5–10.5)
CHLORIDE SERPL-SCNC: 107 MMOL/L (ref 95–110)
CO2 SERPL-SCNC: 23 MMOL/L (ref 22–32)
CREAT SERPL-MCNC: 1.5 MG/DL (ref 0.5–1.5)
CREAT UR-MCNC: 122.4 MG/DL
EOSINOPHIL # BLD: 0.7 K/UL (ref 0–0.7)
EOSINOPHIL NFR BLD: 9 %
ERYTHROCYTE [DISTWIDTH] IN BLOOD BY AUTOMATED COUNT: 13.8 % (ref 11–15)
GLOBULIN PLAS-MCNC: 3.5 G/DL (ref 2.5–3.7)
GLUCOSE SERPL-MCNC: 165 MG/DL (ref 70–99)
HBA1C MFR BLD: 6.9 % (ref 4–6)
HCT VFR BLD AUTO: 44.5 % (ref 41–52)
HGB BLD-MCNC: 14.8 G/DL (ref 13.5–17.5)
LYMPHOCYTES # BLD: 1 K/UL (ref 1–4)
LYMPHOCYTES NFR BLD: 13 %
MCH RBC QN AUTO: 31.1 PG (ref 27–32)
MCHC RBC AUTO-ENTMCNC: 33.2 G/DL (ref 32–37)
MCV RBC AUTO: 93.5 FL (ref 80–100)
MICROALBUMIN UR-MCNC: 59.7 MG/DL (ref 0–1.8)
MICROALBUMIN/CREAT UR: 487.7 MG/G{CREAT} (ref 0–20)
MONOCYTES # BLD: 0.9 K/UL (ref 0–1)
MONOCYTES NFR BLD: 11 %
NEUTROPHILS # BLD AUTO: 5.4 K/UL (ref 1.8–7.7)
NEUTROPHILS NFR BLD: 66 %
OSMOLALITY UR CALC.SUM OF ELEC: 291 MOSM/KG (ref 275–295)
PATIENT FASTING: YES
PLATELET # BLD AUTO: 168 K/UL (ref 140–400)
PMV BLD AUTO: 12.4 FL (ref 7.4–10.3)
POTASSIUM SERPL-SCNC: 4.1 MMOL/L (ref 3.3–5.1)
PROT SERPL-MCNC: 7.1 G/DL (ref 5.9–8.4)
PSA FREE MFR SERPL: 57 %
PSA FREE SERPL-MCNC: 2.7 NG/ML
PSA SERPL-MCNC: 4.7 NG/ML (ref 0–4)
RBC # BLD AUTO: 4.76 M/UL (ref 4.5–5.9)
RBC #/AREA URNS AUTO: 1 /HPF
SODIUM SERPL-SCNC: 138 MMOL/L (ref 136–144)
WBC # BLD AUTO: 8.1 K/UL (ref 4–11)
WBC #/AREA URNS AUTO: 1 /HPF

## 2018-01-01 PROCEDURE — 84154 ASSAY OF PSA FREE: CPT

## 2018-01-01 PROCEDURE — 36415 COLL VENOUS BLD VENIPUNCTURE: CPT

## 2018-01-01 PROCEDURE — 82570 ASSAY OF URINE CREATININE: CPT

## 2018-01-01 PROCEDURE — 83036 HEMOGLOBIN GLYCOSYLATED A1C: CPT

## 2018-01-01 PROCEDURE — G0463 HOSPITAL OUTPT CLINIC VISIT: HCPCS | Performed by: FAMILY MEDICINE

## 2018-01-01 PROCEDURE — 81015 MICROSCOPIC EXAM OF URINE: CPT | Performed by: FAMILY MEDICINE

## 2018-01-01 PROCEDURE — 80053 COMPREHEN METABOLIC PANEL: CPT

## 2018-01-01 PROCEDURE — 82043 UR ALBUMIN QUANTITATIVE: CPT

## 2018-01-01 PROCEDURE — 84153 ASSAY OF PSA TOTAL: CPT

## 2018-01-01 PROCEDURE — 99214 OFFICE O/P EST MOD 30 MIN: CPT | Performed by: FAMILY MEDICINE

## 2018-01-01 PROCEDURE — 85025 COMPLETE CBC W/AUTO DIFF WBC: CPT

## 2018-01-01 RX ORDER — LISINOPRIL 20 MG/1
20 TABLET ORAL DAILY
Qty: 90 TABLET | Refills: 1 | Status: SHIPPED | OUTPATIENT
Start: 2018-01-01

## 2018-01-01 RX ORDER — TRAZODONE HYDROCHLORIDE 50 MG/1
50 TABLET ORAL NIGHTLY PRN
Qty: 90 TABLET | Refills: 0 | Status: SHIPPED | OUTPATIENT
Start: 2018-01-01

## 2018-01-01 RX ORDER — GLIPIZIDE 5 MG/1
5 TABLET ORAL
Qty: 180 TABLET | Refills: 1 | Status: SHIPPED | OUTPATIENT
Start: 2018-01-01 | End: 2018-01-01

## 2018-01-01 RX ORDER — GLIPIZIDE 5 MG/1
5 TABLET ORAL
Qty: 180 TABLET | Refills: 0 | Status: SHIPPED | OUTPATIENT
Start: 2018-01-01

## 2018-01-01 RX ORDER — INSULIN ASPART 100 [IU]/ML
INJECTION, SOLUTION INTRAVENOUS; SUBCUTANEOUS
Qty: 10 ML | Refills: 4 | Status: SHIPPED | OUTPATIENT
Start: 2018-01-01 | End: 2018-01-01

## 2018-01-01 RX ORDER — URSODIOL 300 MG/1
300 CAPSULE ORAL
Qty: 30 CAPSULE | Refills: 0 | Status: SHIPPED | OUTPATIENT
Start: 2018-01-01 | End: 2018-01-01

## 2018-01-01 RX ORDER — INSULIN ASPART 100 [IU]/ML
INJECTION, SOLUTION INTRAVENOUS; SUBCUTANEOUS
Qty: 30 ML | Refills: 2 | Status: SHIPPED | OUTPATIENT
Start: 2018-01-01

## 2018-01-01 RX ORDER — MEMANTINE HYDROCHLORIDE 14 MG/1
14 CAPSULE, EXTENDED RELEASE ORAL DAILY
Qty: 90 CAPSULE | Refills: 2 | Status: SHIPPED | OUTPATIENT
Start: 2018-01-01

## 2018-01-01 RX ORDER — LISINOPRIL 20 MG/1
20 TABLET ORAL DAILY
Qty: 90 TABLET | Refills: 1 | Status: SHIPPED | OUTPATIENT
Start: 2018-01-01 | End: 2018-01-01

## 2018-01-01 RX ORDER — URSODIOL 300 MG/1
300 CAPSULE ORAL
Qty: 90 CAPSULE | Refills: 0 | Status: SHIPPED | OUTPATIENT
Start: 2018-01-01 | End: 2018-01-01

## 2018-01-01 RX ORDER — INSULIN ASPART 100 [IU]/ML
INJECTION, SOLUTION INTRAVENOUS; SUBCUTANEOUS
Qty: 30 ML | Refills: 0 | Status: SHIPPED | OUTPATIENT
Start: 2018-01-01 | End: 2018-01-01

## 2018-01-01 RX ORDER — GLIPIZIDE 5 MG/1
5 TABLET ORAL
Qty: 180 TABLET | Refills: 1 | OUTPATIENT
Start: 2018-01-01

## 2018-01-01 RX ORDER — TRAZODONE HYDROCHLORIDE 50 MG/1
50 TABLET ORAL NIGHTLY PRN
Qty: 30 TABLET | Refills: 0 | Status: SHIPPED | OUTPATIENT
Start: 2018-01-01 | End: 2018-01-01

## 2018-01-01 RX ORDER — DONEPEZIL HYDROCHLORIDE 5 MG/1
5 TABLET, FILM COATED ORAL NIGHTLY
Qty: 90 TABLET | Refills: 1 | Status: SHIPPED | OUTPATIENT
Start: 2018-01-01

## 2018-01-01 RX ORDER — DONEPEZIL HYDROCHLORIDE 5 MG/1
5 TABLET, FILM COATED ORAL NIGHTLY
Qty: 90 TABLET | Refills: 1 | Status: SHIPPED | OUTPATIENT
Start: 2018-01-01 | End: 2018-01-01

## 2018-01-01 RX ORDER — MEMANTINE HYDROCHLORIDE 14 MG/1
1 CAPSULE, EXTENDED RELEASE ORAL DAILY
Qty: 90 CAPSULE | Refills: 2 | Status: SHIPPED | OUTPATIENT
Start: 2018-01-01 | End: 2018-01-01

## 2018-01-05 NOTE — PLAN OF CARE
Called patient to discuss lab results. Patient verbalized understanding of what was discussed and has no further questions for now. Next follow up is scheduled for 04/10/2018.     Problem: Diabetes/Glucose Control  Goal: Glucose maintained within prescribed range  INTERVENTIONS:  - Monitor Blood Glucose as ordered  - Assess for signs and symptoms of hyperglycemia and hypoglycemia  - Administer ordered medications to maintain glucose

## 2018-06-09 NOTE — PROGRESS NOTES
6/9/2018  12:12 PM    Abdulaziz Meraz is a 80year old male. Chief complaint(s): Patient presents with: Follow - Up  Dementia  Diabetes  HTN    HPI:     Abdulaziz Meraz primary complaint is regarding as above.      Patient Teodora Stafford a 80year old male i been present for more than 3 years.  Patient's mental status appears to be gradually deteriorating.  At his current level of functioning, he can bathe, control her bladder, control her bowel function, dress himself, feed himself, converse in a meaningful m Take 1 tablet (5 mg total) by mouth nightly. Disp: 90 tablet Rfl: 1   lisinopril 20 MG Oral Tab Take 1 tablet (20 mg total) by mouth daily.  Disp: 90 tablet Rfl: 1   glipiZIDE 5 MG Oral Tab Take 1 tablet (5 mg total) by mouth 2 (two) times daily before meal Negative for nausea, vomiting, abdominal pain, diarrhea and constipation. Endocrine: Negative for polydipsia and polyuria. Musculoskeletal: Negative for back pain and neck pain. Skin: Negative for rash.    Neurological: Negative for seizures and heada mouth daily. , Disp: 90 tablet, Rfl: 1  •  glipiZIDE 5 MG Oral Tab, Take 1 tablet (5 mg total) by mouth 2 (two) times daily before meals. , Disp: 180 tablet, Rfl: 1  •  Memantine HCl ER (NAMENDA XR) 14 MG Oral Capsule SR 24 Hr, Take 0.0714 capsules (1 mg tot daily.      insulin aspart (NOVOLOG) 100 UNIT/ML Subcutaneous Solution 10 mL 4      Sig: INJECT 7 UNITS SUBCUTANEOUSLY 3 TIMES PER DAY BEFORE MEAL, DISCARD UNUSED PORTION AFTER 28 DAYS      BD INSULIN SYRINGE ULTRAFINE 31G X 15/64\" 0.5 ML Does not apply M Subcutaneous Solution 10 mL 4      Sig: INJECT 7 UNITS SUBCUTANEOUSLY 3 TIMES PER DAY BEFORE MEAL, DISCARD UNUSED PORTION AFTER 28 DAYS      BD INSULIN SYRINGE ULTRAFINE 31G X 15/64\" 0.5 ML Does not apply Misc 100 each 2      Sig: Inject 1 Syringe into th medication as prescribed, try not to miss doses, smoking cessation, weight loss, and stress reduction. FOLLOW-UP: Schedule a follow-up visit in 6 months.            Orders This Visit:    Orders Placed This Encounter      ** Hemoglobin A1C  [E]      ** Mi

## 2018-06-11 NOTE — TELEPHONE ENCOUNTER
Pending Prescriptions Disp Refills    GLIPIZIDE 5 MG Oral Tab [Pharmacy Med Name: GLIPIZIDE 5 MG TABLET] 180 tablet 1     Sig: TAKE 1 TABLET (5 MG TOTAL) BY MOUTH 2 (TWO) TIMES DAILY BEFORE MEALS. LR 6-4-45 # 951 +1, duplicate req.

## 2018-06-12 NOTE — TELEPHONE ENCOUNTER
Pt son states was not dispensed the Memantine ER  that RM sent to pharmacy on 6/9 for pt since instructions do not make sense.  Son points out that RM had sent it the same way in October 2017 but he never picked up the medication since was never informed if

## 2018-06-12 NOTE — TELEPHONE ENCOUNTER
Pt's son Sena Allen called in requesting clarification on the dosage for the following medication:  Pt's son states that he believes that pt was to be prescribed 1.5 mg.  Please advise       Current Outpatient Prescriptions:     •  Memantine HCl ER (NAMENDA XR

## 2018-06-18 NOTE — TELEPHONE ENCOUNTER
Patient's son called and stated the pharmacist told him the dose of Memantine is to low for the patient. He would like to know what he should be taking. I called the [facility] pharmacy who stated the 15 mg dose is ok. It would just have to be ordered.   If t

## 2018-06-18 NOTE — PROGRESS NOTES
Please call patient, the following results are within normal limits:  Better diabetes control, + microalbuminuria, VCPM.

## 2018-09-14 NOTE — TELEPHONE ENCOUNTER
LOV: 6-9-18 Last Rx: 6-9-18     No protocol     Please advise in regards to refill request. Thank You

## 2018-09-14 NOTE — TELEPHONE ENCOUNTER
Current Outpatient Medications:                          insulin aspart (NOVOLOG) 100 UNIT/ML Subcutaneous Solution INJECT 7 UNITS SUBCUTANEOUSLY 3 TIMES PER DAY BEFORE MEAL, DISCARD UNUSED PORTION AFTER 28 DAYS Disp: 10 mL Rfl: 4    Pharmacy faxed docum

## 2018-09-14 NOTE — TELEPHONE ENCOUNTER
Pharmacy called in stating that Pt is almost out of medication and Pt's insurance prefers a 90 day supply. Please advise.

## 2018-09-20 NOTE — PROGRESS NOTES
9/20/2018  11:57 AM    Mayi Roper is a 80year old male. Chief complaint(s): Patient presents with:   Follow - Up: Patient here to f/u and also discuss long term facilty placement  Diabetes  Dementia  HTN    HPI:     Mayi Roper primary complaint is r Conchita Tam a 80year old male present for a follow up regarding her alzheimer's dementia.    The history is obtained from his son. Marce Dale alzheimer's dementia is reported to have been present for more than 3 years.  Patient's mental status appears to be • Hypertension Mother       Social History: Social History    Tobacco Use      Smoking status: Former Smoker        Packs/day: 0.75        Years: 10.00        Pack years: 7.5        Types: Cigarettes        Quit date: 1/1/1980        Years since quitting: Glucose Blood In Vitro Strip Test blood sugar 3 times daily Disp: 300 strip Rfl: 0   FREESTYLE LANCETS Does not apply Misc TID Disp: 300 each Rfl: 0   Blood Glucose Monitoring Suppl (FREESTYLE LITE) Does not apply Device TID Disp: 1 Device Rfl: 0       All Results for orders placed or performed in visit on 06/09/18   HEMOGLOBIN A1C   Result Value Ref Range    Glycohemoglobin (HgA1c) 7.2 (H) 4.0 - 6.0 %   MICROALB/CREAT RATIO, RANDOM URINE   Result Value Ref Range    Creatinine Ur Random 147.6 mg/dL    Microa MEDICATIONS:  •  insulin aspart (NOVOLOG) 100 UNIT/ML Subcutaneous Solution, INJECT 7 UNITS SUBCUTANEOUSLY 3 TIMES PER DAY BEFORE MEAL, DISCARD UNUSED PORTION AFTER 28 DAYS, Disp: 30 mL, Rfl: 0  •  Memantine HCl ER (NAMENDA XR) 14 MG Oral Capsule SR 24 Hr, RECOMMENDATIONS: instructed in use of glucometer ( check fasting glucose daily), return for training in administering insulin injections, adherence to an 1800 calorie ADA diet,  HgbA1C level checked quarterly, daily foot self-inspection, need for yearly fl •  glipiZIDE 5 MG Oral Tab, Take 1 tablet (5 mg total) by mouth 2 (two) times daily before meals. , Disp: 180 tablet, Rfl: 1  •  BD INSULIN SYRINGE ULTRAFINE 31G X 15/64\" 0.5 ML Does not apply Misc, Inject 1 Syringe into the skin 3 (three) times daily.  As Urine Microscopic w Reflex CULTURE      ** Hemoglobin A1C  [E]      ** Microalbumin [id number]      PSA, Total with Reflex to Free [E]      Meds This Visit:  Requested Prescriptions      No prescriptions requested or ordered in this encounter       Imagin

## 2018-09-27 NOTE — TELEPHONE ENCOUNTER
Son called to f/u on message below; states was told by RN to call back if no response in a couple of hours. Encounter changed to Acute from Medication Question to be addressed in a more timely manner.      RM please advise; son asking for response or call b

## 2018-09-27 NOTE — TELEPHONE ENCOUNTER
pt son is calling and he stated that pt was seen on 9/20 and he  talked to you about pt needing to go to a long term care facility  ([facility] in [place]). Son stated that pt does not want to go and he just tells son he is going to leave.  Son [employer]

## 2018-10-09 NOTE — TELEPHONE ENCOUNTER
Please see message below in regards to 30-day Rx sent on 9/27/18. Pharmacy requesting it be written as 90-day script (pended for review).

## 2018-10-12 NOTE — TELEPHONE ENCOUNTER
Routed to cardiology in error. Refilled per protocol for primary care.     Diabetes Medications  Protocol Criteria:  · Appointment scheduled in the past 6 months or the next 3 months  · A1C < 7.5 in the past 6 months  · Creatinine in the past 12 months  · C

## 2018-11-19 PROBLEM — N18.30 KIDNEY DISEASE, CHRONIC, STAGE III (GFR 30-59 ML/MIN) (HCC): Status: ACTIVE | Noted: 2018-01-01

## 2018-11-19 PROBLEM — E11.65 INSULIN DEPENDENT TYPE 2 DIABETES MELLITUS, UNCONTROLLED (HCC): Status: ACTIVE | Noted: 2018-01-01

## 2018-11-19 PROBLEM — IMO0002 INSULIN DEPENDENT TYPE 2 DIABETES MELLITUS, UNCONTROLLED: Status: ACTIVE | Noted: 2018-01-01

## 2018-11-19 PROBLEM — Z79.4 INSULIN DEPENDENT TYPE 2 DIABETES MELLITUS, UNCONTROLLED (HCC): Status: ACTIVE | Noted: 2018-01-01

## 2018-12-10 NOTE — TELEPHONE ENCOUNTER
glipiZIDE 5 MG Oral Tab, Take 1 tablet (5 mg total) by mouth 2 (two) times daily before meals. , Disp: 180 tablet

## 2018-12-11 NOTE — TELEPHONE ENCOUNTER
Refill passed per [facility] protocol.   Diabetes Medications  Protocol Criteria:  · Appointment scheduled in the past 6 months or the next 3 months  · A1C < 7.5 in the past 6 months  · Creatinine in the past 12 months  · Creatinine result < 1.5   Rece

## 2019-01-01 ENCOUNTER — PATIENT OUTREACH (OUTPATIENT)
Dept: CASE MANAGEMENT | Age: 84
End: 2019-01-01

## 2019-01-08 NOTE — PROGRESS NOTES
Outreached to patient in regards to scheduling Medicare Annual exam (AHA). Per patient's daughter in law patient is in a [address] in [place] and will no longer be seeing Dr. Debbie Mccarthy. . Thank you.

## 2020-06-10 NOTE — TELEPHONE ENCOUNTER
Addended by: GALINA YOUNG on: 6/10/2020 01:53 PM     Modules accepted: Orders     [facility] unable to obtain authorization from pt's BCBS of [place]  As results unable to see pt due to ins

## 2021-08-03 NOTE — PROGRESS NOTES
INFECTIOUS DISEASE PROGRESS NOTE    Gerre Numbers Patient Status:  Inpatient    1932 MRN [id number]   Location [facility] 3W/SW Attending Len Justice MD   Hosp Day # 5 PCP Kenneth Brewer MD     Subjective:  Patient seen and examined, no breath for MR imaging m                  Magnetic resonance cholangiopancreatography was also performed.                    MRCP FINDINGS:     GALLBLADDER:           Normal.  No gallstones.     BILE DUCTS:    Marked intra-and extrahepatic biliary dilatatio lymphadenopathy in the left periaortic region measuring 12 mm shortest diameter. Subphrenic lymph node at the GE junction measures 2.4 x 1.6 cm in the diaphragmatic hiatus    ASCITES:         None.     BONES:           Mild dextroscoliosis midlumbar spine. presents here with shaking chills, sepsis, leukocytosis, elevated LFTs.  CT shows CBD stent with mass-like area obstructing CBD.  UA with mild pyuria, ucx with >100,000 GNRs.  BC + GNRs.  Started on meropenem.  ID consulted.      # Ecoli sepsis, bacteremia- Crescentic Advancement Flap Text: The defect edges were debeveled with a #15 scalpel blade.  Given the location of the defect and the proximity to free margins a crescentic advancement flap was deemed most appropriate.  Using a sterile surgical marker, the appropriate advancement flap was drawn incorporating the defect and placing the expected incisions within the relaxed skin tension lines where possible.    The area thus outlined was incised deep to adipose tissue with a #15 scalpel blade.  The skin margins were undermined to an appropriate distance in all directions utilizing iris scissors.

## (undated) DEVICE — SNARE CAPTIFLEX MICRO-OVL OLY

## (undated) DEVICE — WIREGUIDED RETRIEVAL BASKET: Brand: TRAPEZOID RX

## (undated) DEVICE — SINGLE-USE BIOPSY FORCEPS: Brand: SPYBITE

## (undated) DEVICE — LOCKING DEVICE RX & BIOPSY CAP

## (undated) DEVICE — ENDOSCOPY PACK UPPER: Brand: MEDLINE INDUSTRIES, INC.

## (undated) DEVICE — RETRIEVAL BALLOON CATHETER: Brand: EXTRACTOR™ PRO RX

## (undated) DEVICE — ACCESS AND DELIVERY CATHETER: Brand: SPYSCOPE DS

## (undated) DEVICE — SPHINCTEROTOME: Brand: HYDRATOME RX 44

## (undated) DEVICE — GLOVE SURG SENSICARE SZ 7-1/2

## (undated) DEVICE — GUIDEWIRE DREAM STR .035 450

## (undated) DEVICE — INFLATION DEVICE: Brand: ENCORE 26

## (undated) DEVICE — DEVICE SPEC RTRVL RPTR 2.4MM

## (undated) DEVICE — Device

## (undated) DEVICE — BALLOON DILATATION CATHETER: Brand: HURRICANE™ RX

## (undated) NOTE — IP AVS SNAPSHOT
[facility]    [address], [address], [place] ~ [phone]                Discharge Summary   5/24/2017    Jackie Mason           Admission Information        Provider Department    5/24/2017 Speedy Bran MD [facility] - Do not drive today. - Do not operate any machinery today (including kitchen equipment).     Esophagogastroduodenoscopy:  - You may have a sore throat for 2-3 days following the exam. This is normal. Gargling with warm salt water (1/2 tsp salt to 1 glass 1 -- (05/05/17)  6.2 (05/05/17)  1.5 (05/05/17)  1.4 (H) (05/05/17)  1.0 (H) (05/05/17)  0.1    (05/04/17)  63 (05/04/17)  13 (05/04/17)  15 (05/04/17)  9 (05/04/17)  1  (05/04/17)  6.0 (05/04/17)  1.3 (05/04/17)  1.4 (H) (05/04/17)  0.8 (H) (05/04/17)  0. We want to hear from you, please share your experience with us by returning the survey you will receive in the mail. Thank you! [employer] [employer]     Sign up for [employer], your secure online medical record.   [employer] will allow you to access patient instructi

## (undated) NOTE — LETTER
[facility]  Administration of Anesthesia  1. Cinthia Motta, or _________________________________ acting on his/her behalf, (Patient) (Dependent/Representative) request to receive anesthesia for my pending procedure/operation/treatment. pressure, difficulty urinating, slowing of the baby's heart rate and headache. Rare risks include infections, high spinal         block, spinal bleeding, seizure, cardiac arrest and death.   7.   AWARENESS: I understand that it is possible (but unlike ________________________________________________  (Date) (Time)                                                                                                  (Responsible person in case of minor/ unconscious pt) /Relationship    My signature below affir

## (undated) NOTE — IP AVS SNAPSHOT
Patient Demographics     Address Phone E-mail Address    [address]  [place] [address] [phone] (Home) [email or web address]      Emergency Contact(s)     Name Relation Home Work [address] Son [phone]        Allergies as of 5 Test twice daily    Nikki Rodriguez                              insulin aspart 100 UNIT/ML Sopn   Last time this was given:  9 Units on 5/5/2017 12:30 PM   Commonly known as:  Asia Moses   Next dose due:   Take tonight, 5/5 with dinner   Notes to [facility] [id number] insulin aspart (NOVOLOG) 100 UNIT/ML flexpen 1-5 Units 05/04/17 1714 Given      [id number] insulin aspart (NOVOLOG) 100 UNIT/ML flexpen 1-5 Units 05/05/17 1230 Given      [id number] insulin aspart (NOVOLOG) 100 UNIT/ML flexpen 5 Units 05/04/17 171 MD BLOOD SMEAR CONSULT [[id number]]  Resulted: 05/05/17 1049, Result status: Final result    Ordering provider: Arabella Kate MD  05/05/17 1021 Resulting lab: [facility]    Specimen Information    Type Source Collected On   Blood  05/05/17 0535 hypersensitivity reactions, drug allergies, cutaneous disorders, collagen vascular/connective tissue diseases, parasitic infections, immunodeficiency disorders, sarcoidosis and some neoplastic conditions (Hodgkin's lymphoma, non-Hodgkin's lymphoma, carcino Testing Performed By     Franko Ayoub Name Director Address Valid Date Range    [place] [address] LAB Doretha Calderon  [address] [address] 02/03/16 1201 - Present    [address] [place] [facility] Stoney Villagran Collected:  04/29/17 1226    Order Status:  Completed Lab Status:  Final result Updated:  05/01/17 0951    Specimen Information:  Urine from Urine, clean catch      Urine Culture >100,000 CFU/ML Klebsiella pneumoniae (A)     Susceptibility      Klebsiella which showed subacute subdural hematomas. They continued supportive care at home. They say that over the last 24 hours they have noticed he is not eating well and very lethargic.   He vomited last night and did not sleep well so they brought him to the ER ABDOMEN: Soft and non-tender. Bowel sounds were present. Old healed midline scar  MUSCULOSKELETAL:  There was no deformity. There was full range of motion in all the extremities.    EXTREMITIES: There was no edema, clubbing or cyanosis  NEUROLOGICAL:  Th -trend Lactic acid  -CT a/p pending  -CLD  -appreciate GI eval  -rule out other potential sources with CXR and U/A, C+S    Elevated LFTs  -trend  -hep panel ordered    Dementia  -start work up, TSH, B12    DM II  -hold oral meds  -sliding scale    Subdural Original Note by Vanessa Leach MD (Physician) filed at 2017  2:08 PM         [address] Arango Patient Status:  Emergency    1932 MRN [id number]   [address] Smoking Status: Former Smoker                   Packs/Day: 0.75  Years: 10        Types: Cigarettes      Quit date: 01/01/1980    Smokeless Status: Never Used                        Alcohol Use: No              Allergies/Medications:    Allergies: No Know collections at the left frontal subdural space, and at the posterior left parietal subdural space about the same in overall amount from previous recent study of 3/20/17, but slightly lower density than previously seen suggesting more chronic subdural hemat did later voice to the RN doing his intake that they would like him to be a DNR. I have not had the chance to verify if the family understands fully and will speak with them about this at a later time.           Be Solano MD      **Certification primary care doctor at the end of march who sent him for a CT of the brain which showed subacute subdural hematomas. They continued supportive care at home. They say that over the last 24 hours they have noticed he is not eating well and very lethargic. LUNGS:  Air entry was good. No crackles or wheezes   ABDOMEN: Soft and non-tender. Bowel sounds were present. Old healed midline scar  MUSCULOSKELETAL:  There was no deformity. There was full range of motion in all the extremities.    EXTREMITIES: There -started on Zosyn in ER, will continue for now  -trend LFTs  -CT a/p pending  -CLD  -appreciate GI eval  -rule out other potential sources with CXR and U/A, C+S    Elevated LFTs  -trend  -hep panel ordered    Dementia  -start work up, TSH, B12    DM II  -h HISTORY OF PRESENT ILLNESS:  The patient is an 55-year-old male who recently arrived to the [place].   He is originally from [facility].  He was brought to the emergency department by family complaining of acute altered mental status and generalized stones were removed.   There was a small stone that was apparently up on the common hepatic duct that could not be retrieved and persistent spherical filling defect on mid or proximal common hepatic duct, likely representing either a stone impacted at an in apparently he had an appendectomy in the past.    MEDICATIONS:  At home, Actos, lisinopril, insulin, and he also takes glipizide. ALLERGIES:  No known drug allergies. SOCIAL HISTORY:  As described above. The patient lives with family.   He is partial Radiology and a repeat ERCP as is planned by Dr. Jaci Denney with a Melber scope to evaluate the filling defect in the more proximal common hepatic duct possible glide lithotripsia . This seems appropriate.   Surgery would be demanding in this patient whose phys performed sit to stand with CG assist, and pt ambulated to toilet with CG assist with cues needed for direction with RW, and safety when up. Decreased standing endurance noted when up. Pt transferred to toilet with use of grab bar for support.  toileting wi Supine to Sit : Supervision  Sit to Stand: Minimum assistance (cg assist)    Toilet Transfer: CG assist with grab bar for support  Shower Transfer: NT  Chair Transfer: CG assist    Bedroom Mobility: pt ambulated 25 ft in room with RW for support, and cues

## (undated) NOTE — LETTER
[facility]  Administration of Anesthesia  1. Edgar Trinidad, or _________________________________ acting on his/her behalf, (Patient) (Dependent/Representative) request to receive anesthesia for my pending procedure/operation/treatment. pressure, difficulty urinating, slowing of the baby's heart rate and headache. Rare risks include infections, high spinal         block, spinal bleeding, seizure, cardiac arrest and death.   7.   AWARENESS: I understand that it is possible (but unlike ________________________________________________  (Date) (Time)                                                                                                  (Responsible person in case of minor/ unconscious pt) /Relationship    My signature below affir

## (undated) NOTE — LETTER
October 27, 2017     [address] [place] [address],[address] [address]      Dear Verdang Minium:    Below are the results from your recent visit:  Please call patient, results are within normal limits.      Resulted Orders   HEMOGLOBIN A1C   Result Value If you have any questions or concerns, please don't hesitate to call.  [phone]

## (undated) NOTE — LETTER
[facility]  Administration of Anesthesia  1. Judi Huffman, or _________________________________ acting on his/her behalf, (Patient) (Dependent/Representative) request to receive anesthesia for my pending procedure/operation/treatment. pressure, difficulty urinating, slowing of the baby's heart rate and headache. Rare risks include infections, high spinal         block, spinal bleeding, seizure, cardiac arrest and death.   7.   AWARENESS: I understand that it is possible (but unlike ________________________________________________  (Date) (Time)                                                                                                  (Responsible person in case of minor/ unconscious pt) /Relationship    My signature below affir

## (undated) NOTE — MR AVS SNAPSHOT
[place]  [address]  [phone]               Thank you for choosing us for your health care visit with Bianca Gonzalez MD.  We are glad to serve you and happy to provide you with this summary acquired. Please contact the Patient Business Office at [phone] if you have any questions related to insurance coverage. Thank you. Monday March 20, 2017     Imaging:  CT SPINE CERVICAL (CPT=72125)    Instructions:   To schedule a test at any [facility] This list is accurate as of: 3/20/17  4:20 PM.  Always use your most recent med list.                Enalapril Maleate 20 MG Tabs   Take 20 mg by mouth daily.    Commonly known as:  VASOTEC           glipiZIDE 10 MG Tabs   Take 2 tablets by mouth 2 (two) ti return for a follow-up Blood Pressure Check in 1 year.      Lifestyle Modification Recommendations:    Modification Recommendation   Weight Reduction Maintain normal body weight (body mass index 18.5-24.9 kg/m2)   DASH eating plan Adopt a diet rich in fruit ? Always wear good shoes with proper support and traction. ? Always use hand rails on stairs and escalators. ? Cover porch steps with gritty weather proof paint. ? Pay attention to curbs and other changes in surfaces when out in the community.   ? Take c Eat plenty of low-fat dairy products High fat meats and dairy   Choose whole grain products Foods high in sodium   Water is best for hydration Fast food.    Eat at home when possible     Tips for increasing your physical activity – Adults who are physically

## (undated) NOTE — IP AVS SNAPSHOT
[address] [facility] [phone]                Discharge Summary   4/29/2017    Raghavendra Michele           Admission Information        Provider Department    4/29/2017 Navjot Chan.  Joanna Powers MD [facility] 3w/S Continue to give correction insulin even if NPO  Give 1 unit for blood glucose 160-200 mg/dL  Give 2 units for blood glucose 201-240 mg/dL  Give 3 units for blood glucose 241-280 mg/dL  Give 4 units for blood glucose 281-320 mg/dL  Give 5 units for blood g - Ciprofloxacin HCl 500 MG Tabs  - Donepezil HCl 5 MG Tabs  - glipiZIDE 10 MG Tabs  - insulin aspart 100 UNIT/ML Sopn  - lisinopril 20 MG Tabs              Patient Instructions       PLEASE FOLLOW UP WITH YOUR PRIMARY CARE PHYSICIAN AFTER BEING DISCHARGED 0.8 (H) (05/04/17)  0.1    (05/03/17)  69 (05/03/17)  12 (05/03/17)  11 (05/03/17)  4 (05/03/17)  1  (05/03/17)  7.6 (05/03/17)  1.3 (05/03/17)  1.2 (H) (05/03/17)  0.4 (05/03/17)  0.1      Metabolic Lab Results  (Last result in the past 90 days)    HgbA1C [employer] will allow you to access patient instructions from your recent visit,  view other health information, and more. To sign up or find more information, go to [email or web address] and click on the Sign Up Now link in the [employer] box.      Enter with standing), heart rate changes, headaches, nausea/vomiting   What to report to your healthcare team:  Dizziness, nausea, chest pain, weakness, numbness           Blood Sugar Medications     insulin aspart 100 UNIT/ML Subcutaneous Solution Pen-injector

## (undated) NOTE — MR AVS SNAPSHOT
[place]  [address]  [phone]               Thank you for choosing us for your health care visit with Kika Yee MD.  We are glad to serve you and happy to provide you with this summary Glucose Blood Strp   Test twice daily   Commonly known as:  EMBRACE BLOOD GLUCOSE TEST           insulin aspart 100 UNIT/ML Soln   Inject 7 units three times daily before meals   What changed:    - how to take this  - when to take this  - additional instr Support Staff. Remember, MyChart is NOT to be used for urgent needs. For medical emergencies, dial 911.            Visit [facility] online at  [email or web address]

## (undated) NOTE — LETTER
[facility]  Administration of Anesthesia  1. Nieves Almodovar, or _________________________________ acting on his/her behalf, (Patient) (Dependent/Representative) request to receive anesthesia for my pending procedure/operation/treatment. pressure, difficulty urinating, slowing of the baby's heart rate and headache. Rare risks include infections, high spinal         block, spinal bleeding, seizure, cardiac arrest and death.   7.   AWARENESS: I understand that it is possible (but unlike ________________________________________________  (Date) (Time)                                                                                                  (Responsible person in case of minor/ unconscious pt) /Relationship    My signature below affir

## (undated) NOTE — LETTER
September 27, 2018     [place]      Dear Christine Kramer:    Below are the results from your recent visit:    Resulted Orders   COMP METABOLIC PANEL (14)   Result Value Ref Range    Glucose 165 (H) 70 - 99 mg/dL RDW 13.8 11.0 - 15.0 %     140 - 400 K/UL    MPV 12.4 (H) 7.4 - 10.3 fL    Neutrophil % 66 %    Lymphocyte % 13 %    Monocyte % 11 %    Eosinophil % 9 %    Basophil % 1 %    Neutrophil Absolute 5.4 1.8 - 7.7 K/UL    Lymphocyte Absolute 1.0 1.0 - 4.

## (undated) NOTE — LETTER
6/3/2017              [id number]         [place] [id number]         Dear Mr Mariela Ventura and family,    During the ERCP procedure on May 24, 2017 at [facility], I took biopsies of the narrowing or \"stricture\" of y

## (undated) NOTE — LETTER
June 18, 2018     [facility]  [address]      Dear Edith Cain:    Below are the results from your recent visit:  Better diabetes control, + microalbuminuria, VCPM.     Resulted Orders   HEMOGLOBIN A1C   Result Value Ref Range    Glyco